# Patient Record
Sex: MALE | Race: WHITE | NOT HISPANIC OR LATINO
[De-identification: names, ages, dates, MRNs, and addresses within clinical notes are randomized per-mention and may not be internally consistent; named-entity substitution may affect disease eponyms.]

---

## 2017-11-15 PROBLEM — Z00.00 ENCOUNTER FOR PREVENTIVE HEALTH EXAMINATION: Status: ACTIVE | Noted: 2017-11-15

## 2019-04-09 ENCOUNTER — APPOINTMENT (OUTPATIENT)
Dept: NUCLEAR MEDICINE | Facility: CLINIC | Age: 69
End: 2019-04-09
Payer: COMMERCIAL

## 2019-04-09 PROCEDURE — A9552: CPT

## 2019-04-09 PROCEDURE — 78815 PET IMAGE W/CT SKULL-THIGH: CPT | Mod: PS

## 2019-08-07 ENCOUNTER — APPOINTMENT (OUTPATIENT)
Dept: CARDIOLOGY | Facility: CLINIC | Age: 69
End: 2019-08-07
Payer: COMMERCIAL

## 2019-08-07 VITALS
SYSTOLIC BLOOD PRESSURE: 120 MMHG | HEART RATE: 63 BPM | HEIGHT: 71 IN | WEIGHT: 217 LBS | DIASTOLIC BLOOD PRESSURE: 78 MMHG | BODY MASS INDEX: 30.38 KG/M2

## 2019-08-07 DIAGNOSIS — Z80.1 FAMILY HISTORY OF MALIGNANT NEOPLASM OF TRACHEA, BRONCHUS AND LUNG: ICD-10-CM

## 2019-08-07 DIAGNOSIS — Z77.098 CONTACT WITH AND (SUSPECTED) EXPOSURE TO OTHER HAZARDOUS, CHIEFLY NONMEDICINAL, CHEMICALS: ICD-10-CM

## 2019-08-07 DIAGNOSIS — Z85.47 PERSONAL HISTORY OF MALIGNANT NEOPLASM OF TESTIS: ICD-10-CM

## 2019-08-07 DIAGNOSIS — Z87.891 PERSONAL HISTORY OF NICOTINE DEPENDENCE: ICD-10-CM

## 2019-08-07 DIAGNOSIS — Z80.0 FAMILY HISTORY OF MALIGNANT NEOPLASM OF DIGESTIVE ORGANS: ICD-10-CM

## 2019-08-07 DIAGNOSIS — Z78.9 OTHER SPECIFIED HEALTH STATUS: ICD-10-CM

## 2019-08-07 PROCEDURE — 93000 ELECTROCARDIOGRAM COMPLETE: CPT

## 2019-08-07 PROCEDURE — 99214 OFFICE O/P EST MOD 30 MIN: CPT

## 2019-08-07 NOTE — DISCUSSION/SUMMARY
[FreeTextEntry1] : Patient with atypical chest pain, abnormal EKG with changes compared to previous one a few years ago, status post radiation treatment and chemotherapy for right-sided lung adenocarcinoma. Further evaluation with stress echocardiogram is needed. Baseline EKG is abnormal. Regular ETT would not be helpful. Avoid radiation exposure from nuclear stress testing due to history of radiation treatment. Stress echocardiogram is the best option\par \par We have requested previous fasting lipid profile from [facility]\par \par Patient with history of dilated ascending aorta and abdominal aorta: Follow up with ultrasound.\par \par Systolic ejection murmur secondary to aortic stenosis. Follow up on echocardiogram\par \par Continue current medications\par \par Followup after above test results.

## 2019-08-07 NOTE — HISTORY OF PRESENT ILLNESS
[FreeTextEntry1] : Nolan is a pleasant 68-year-old male with history of prostate cancer many years ago, hypertension, lung cancer for which he recently underwent chemotherapy and radiation treatment  ( stage IIIa, N2, M0 adenocarcinoma right lung).  Chemotherapy Therapy included carboplatin and Alimta.\par \par Patient has a dry cough. He denies hemoptysis or sputum\par \par My shortness of breath on exertion. Rare episodes of atypical chest pain participating in location and not exertional. It subsides quickly on its own.\par \par No symptoms of neuropathy. Denies any syncope, episodes of CHF, CVA or palpitations

## 2019-08-07 NOTE — PHYSICAL EXAM
[General Appearance - Well Developed] : well developed [Normal Appearance] : normal appearance [Well Groomed] : well groomed [No Deformities] : no deformities [General Appearance - Well Nourished] : well nourished [General Appearance - In No Acute Distress] : no acute distress [Normal Conjunctiva] : the conjunctiva exhibited no abnormalities [Eyelids - No Xanthelasma] : the eyelids demonstrated no xanthelasmas [Normal Oral Mucosa] : normal oral mucosa [No Oral Pallor] : no oral pallor [No Oral Cyanosis] : no oral cyanosis [Auscultation Breath Sounds / Voice Sounds] : lungs were clear to auscultation bilaterally [Respiration, Rhythm And Depth] : normal respiratory rhythm and effort [Exaggerated Use Of Accessory Muscles For Inspiration] : no accessory muscle use [Heart Sounds] : normal S1 and S2 [Heart Rate And Rhythm] : heart rate and rhythm were normal [Abdomen Soft] : soft [Abdomen Tenderness] : non-tender [Abnormal Walk] : normal gait [Gait - Sufficient For Exercise Testing] : the gait was sufficient for exercise testing [Nail Clubbing] : no clubbing of the fingernails [Cyanosis, Localized] : no localized cyanosis [Skin Color & Pigmentation] : normal skin color and pigmentation [] : no rash [No Venous Stasis] : no venous stasis [No Skin Ulcers] : no skin ulcer [Skin Lesions] : no skin lesions [Oriented To Time, Place, And Person] : oriented to person, place, and time [No Xanthoma] : no  xanthoma was observed [Mood] : the mood was normal [Affect] : the affect was normal [No Anxiety] : not feeling anxious [Memory Recent] : recent memory was not impaired [FreeTextEntry1] : 2/ 6 systolic ejection Murmur

## 2019-08-07 NOTE — ASSESSMENT
[FreeTextEntry1] : Review today:\par - EKG 08/07/19: Sinus rhythm. T. inversion in inferior leads and V6.\par - Labs May 2019: FBS 87,  abnormal creatinine. Normal LFT.\par - Echocardiogram September 2016: Normal LV function. Mild aortic stenosis. Aortic root 4.7 cm. Ascending aorta 4.4 cm she had\par - abdominal ultrasound September 2016: Proximal aorta 3.0 cm.

## 2019-08-15 ENCOUNTER — NON-APPOINTMENT (OUTPATIENT)
Age: 69
End: 2019-08-15

## 2019-08-20 ENCOUNTER — APPOINTMENT (OUTPATIENT)
Dept: CARDIOLOGY | Facility: CLINIC | Age: 69
End: 2019-08-20
Payer: COMMERCIAL

## 2019-08-20 PROCEDURE — 93306 TTE W/DOPPLER COMPLETE: CPT

## 2019-08-29 ENCOUNTER — APPOINTMENT (OUTPATIENT)
Dept: CARDIOLOGY | Facility: CLINIC | Age: 69
End: 2019-08-29
Payer: COMMERCIAL

## 2019-08-29 PROCEDURE — 93979 VASCULAR STUDY: CPT

## 2019-08-29 PROCEDURE — 93351 STRESS TTE COMPLETE: CPT

## 2019-08-30 ENCOUNTER — APPOINTMENT (OUTPATIENT)
Dept: CARDIOLOGY | Facility: CLINIC | Age: 69
End: 2019-08-30
Payer: COMMERCIAL

## 2019-08-30 VITALS
SYSTOLIC BLOOD PRESSURE: 106 MMHG | HEART RATE: 74 BPM | BODY MASS INDEX: 30.38 KG/M2 | HEIGHT: 71 IN | WEIGHT: 217 LBS | DIASTOLIC BLOOD PRESSURE: 70 MMHG | OXYGEN SATURATION: 96 %

## 2019-08-30 PROCEDURE — 99214 OFFICE O/P EST MOD 30 MIN: CPT

## 2019-09-02 NOTE — HISTORY OF PRESENT ILLNESS
[FreeTextEntry1] : Nolan is a pleasant 68-year-old male with history of prostate cancer many years ago, hypertension, lung cancer for which he recently underwent chemotherapy and radiation treatment  ( stage IIIa, N2, M0 adenocarcinoma right lung).  Chemotherapy Therapy included carboplatin and Alimta.\par \par Patient has a dry cough. He denies hemoptysis or sputum\par \par Mild  shortness of breath on exertion. Rare episodes of atypical chest pain participating in location and not exertional. It subsides quickly on its own.\par \par No symptoms of neuropathy. Denies any syncope, episodes of CHF, CVA or palpitations

## 2019-09-02 NOTE — PHYSICAL EXAM
[General Appearance - Well Developed] : well developed [Normal Appearance] : normal appearance [General Appearance - Well Nourished] : well nourished [Well Groomed] : well groomed [General Appearance - In No Acute Distress] : no acute distress [No Deformities] : no deformities [Normal Conjunctiva] : the conjunctiva exhibited no abnormalities [Eyelids - No Xanthelasma] : the eyelids demonstrated no xanthelasmas [Normal Oral Mucosa] : normal oral mucosa [No Oral Cyanosis] : no oral cyanosis [No Oral Pallor] : no oral pallor [Respiration, Rhythm And Depth] : normal respiratory rhythm and effort [Exaggerated Use Of Accessory Muscles For Inspiration] : no accessory muscle use [Auscultation Breath Sounds / Voice Sounds] : lungs were clear to auscultation bilaterally [Heart Rate And Rhythm] : heart rate and rhythm were normal [Abdomen Soft] : soft [Heart Sounds] : normal S1 and S2 [Abdomen Tenderness] : non-tender [Abnormal Walk] : normal gait [Nail Clubbing] : no clubbing of the fingernails [Gait - Sufficient For Exercise Testing] : the gait was sufficient for exercise testing [Cyanosis, Localized] : no localized cyanosis [Skin Color & Pigmentation] : normal skin color and pigmentation [No Venous Stasis] : no venous stasis [Skin Lesions] : no skin lesions [] : no rash [No Skin Ulcers] : no skin ulcer [No Xanthoma] : no  xanthoma was observed [Oriented To Time, Place, And Person] : oriented to person, place, and time [Affect] : the affect was normal [Mood] : the mood was normal [No Anxiety] : not feeling anxious [Memory Recent] : recent memory was not impaired [FreeTextEntry1] : 2/ 6 systolic ejection Murmur

## 2019-09-02 NOTE — DISCUSSION/SUMMARY
[FreeTextEntry1] : Patient with atypical chest pain - Unlikely to be angina. Stress echocardiogram did not show any ischemia.\par \par status post radiation treatment and chemotherapy for right-sided lung adenocarcinoma. \par \par I gave him a lab slip to recheck his Lipids and LFT\par \par Patient with history of dilated ascending aorta and abdominal aorta: Follow up with ultrasound Showed aortic root 4.5 cm. Prox aorta 3.4 cm ( was 3.0 cm in 2016)\par \par Systolic ejection murmur secondary to aortic stenosis. Follow up on echocardiogram showed mild-to-moderate aortic stenosis\par \par Continue current medications\par \par

## 2019-09-02 NOTE — ASSESSMENT
[FreeTextEntry1] : Review today:\par - EKG 08/07/19: Sinus rhythm. T. inversion in inferior leads and V6.\par - Labs May 2019: FBS 87,  abnormal creatinine. Normal LFT.\par - Echocardiogram September 2016: Normal LV function. Mild aortic stenosis. Aortic root 4.7 cm. Ascending aorta 4.4 cm she had\par - abdominal ultrasound September 2016: Proximal aorta 3.0 cm.\par - Stress echocardiogram August 2019: Exercised 6:30 minutes on Syed protocol. No angina. No ischemia by stress echocardiogram. Exercise PASP 36.\par -Echocardiogram August 2019: Ejection fraction 60%. Aortic root 4.5 cm. Mild-to-moderate aortic stenosis.\par - Abd Aortic US Aug 2019: prox aorta 3.4 cm

## 2020-02-05 ENCOUNTER — APPOINTMENT (OUTPATIENT)
Dept: CARDIOLOGY | Facility: CLINIC | Age: 70
End: 2020-02-05
Payer: COMMERCIAL

## 2020-02-05 PROCEDURE — 93979 VASCULAR STUDY: CPT

## 2020-02-05 PROCEDURE — 93306 TTE W/DOPPLER COMPLETE: CPT

## 2020-02-07 ENCOUNTER — APPOINTMENT (OUTPATIENT)
Dept: CARDIOLOGY | Facility: CLINIC | Age: 70
End: 2020-02-07
Payer: COMMERCIAL

## 2020-02-07 VITALS
BODY MASS INDEX: 30.1 KG/M2 | HEIGHT: 71 IN | HEART RATE: 75 BPM | DIASTOLIC BLOOD PRESSURE: 76 MMHG | OXYGEN SATURATION: 96 % | SYSTOLIC BLOOD PRESSURE: 114 MMHG | WEIGHT: 215 LBS

## 2020-02-07 PROCEDURE — 99214 OFFICE O/P EST MOD 30 MIN: CPT

## 2020-02-07 NOTE — HISTORY OF PRESENT ILLNESS
[FreeTextEntry1] : Nolan is a pleasant 69-year-old male with history of prostate cancer many years ago, hypertension, lung cancer for which he  underwent chemotherapy and radiation treatment  ( stage IIIa, N2, M0 adenocarcinoma right lung).  Chemotherapy Therapy included carboplatin and Alimta.\par \par Patient has a dry cough only in a.m. that has remained unchanged. He denies hemoptysis or sputum\par \par Mild  shortness of breath on exertion.  No atypical chest pain\par \par No symptoms of neuropathy. Denies any syncope, episodes of CHF, CVA or palpitations\par \par Most recent echocardiogram in February 20 showed normal EF and moderate aortic stenosis, dilated aortic root and ascending aorta to 4.5 cm, all PASP and moderate LVH\par \par Abdominal aortic ultrasound also showed mild dilation of proximal aorta to 3.2 cm.  This is unchanged from August 2019.  He is asymptomatic for dilated abdominal aorta

## 2020-02-07 NOTE — DISCUSSION/SUMMARY
[FreeTextEntry1] : No episodes of chest pain.  Mild shortness of breath on exertion has remained unchanged.\par \par status post radiation treatment and chemotherapy for right-sided lung adenocarcinoma. \par \par Patient with history of dilated ascending aorta and abdominal aorta: Follow up with ultrasound Showed aortic root 4.5 cm. Prox abdominal aorta 3.2 cm ( was 3.0 cm in 2016)\par \par Systolic ejection murmur secondary to aortic stenosis. Follow up on echocardiogram showed moderate aortic stenosis, asymptomatic.  Perhaps radiation treatment may have contributed to etiology\par \par Continue current medications\par \par Thank you for this referral and allowing me to participate in the care of this patient.  If can be of any further help or  if you have any questions, please do not hesitate to contact me\par \par \par Sincerely,\par \par Ja Myers MD, FACC, JOSE LUIS

## 2020-02-07 NOTE — ASSESSMENT
[FreeTextEntry1] : Tests:\par - EKG 08/07/19: Sinus rhythm. T. inversion in inferior leads and V6.\par - Labs May 2019: FBS 87,  abnormal creatinine. Normal LFT.\par \par Review today:\par - Echocardiogram September 2016: Normal LV function. Mild aortic stenosis. Aortic root 4.7 cm. Ascending aorta 4.4 cm she had\par - abdominal ultrasound September 2016: Proximal aorta 3.0 cm.\par - Stress echocardiogram August 2019: Exercised 6:30 minutes on Syed protocol. No angina. No ischemia by stress echocardiogram. Exercise PASP 36.\par -Echocardiogram August 2019: Ejection fraction 60%. Aortic root 4.5 cm. Mild-to-moderate aortic stenosis.\par - Abd Aortic US Aug 2019: prox aorta 3.4 cm\par -Abdominal aorta ultrasound February 2020: Proximal aorta 3.2 cm\par -Echocardiogram February 2020: Moderate AS, moderate LVH, normal EF, normal PASP.  Ascending aorta 4.5 cm

## 2020-02-07 NOTE — PHYSICAL EXAM
[General Appearance - Well Developed] : well developed [Normal Appearance] : normal appearance [Well Groomed] : well groomed [General Appearance - Well Nourished] : well nourished [No Deformities] : no deformities [Normal Conjunctiva] : the conjunctiva exhibited no abnormalities [General Appearance - In No Acute Distress] : no acute distress [Eyelids - No Xanthelasma] : the eyelids demonstrated no xanthelasmas [Normal Oral Mucosa] : normal oral mucosa [No Oral Pallor] : no oral pallor [No Oral Cyanosis] : no oral cyanosis [Respiration, Rhythm And Depth] : normal respiratory rhythm and effort [Exaggerated Use Of Accessory Muscles For Inspiration] : no accessory muscle use [Auscultation Breath Sounds / Voice Sounds] : lungs were clear to auscultation bilaterally [Heart Rate And Rhythm] : heart rate and rhythm were normal [Heart Sounds] : normal S1 and S2 [Abdomen Tenderness] : non-tender [Abdomen Soft] : soft [Abnormal Walk] : normal gait [Gait - Sufficient For Exercise Testing] : the gait was sufficient for exercise testing [Nail Clubbing] : no clubbing of the fingernails [Cyanosis, Localized] : no localized cyanosis [Skin Color & Pigmentation] : normal skin color and pigmentation [] : no rash [No Venous Stasis] : no venous stasis [Skin Lesions] : no skin lesions [No Skin Ulcers] : no skin ulcer [No Xanthoma] : no  xanthoma was observed [Oriented To Time, Place, And Person] : oriented to person, place, and time [Affect] : the affect was normal [Memory Recent] : recent memory was not impaired [Mood] : the mood was normal [No Anxiety] : not feeling anxious [FreeTextEntry1] : 2/ 6 systolic ejection Murmur is unchanged

## 2020-09-11 ENCOUNTER — APPOINTMENT (OUTPATIENT)
Dept: CARDIOLOGY | Facility: CLINIC | Age: 70
End: 2020-09-11
Payer: COMMERCIAL

## 2020-09-11 PROCEDURE — 93306 TTE W/DOPPLER COMPLETE: CPT

## 2020-10-03 ENCOUNTER — APPOINTMENT (OUTPATIENT)
Dept: DISASTER EMERGENCY | Facility: CLINIC | Age: 70
End: 2020-10-03

## 2020-10-04 LAB — SARS-COV-2 N GENE NPH QL NAA+PROBE: NOT DETECTED

## 2020-10-06 ENCOUNTER — OUTPATIENT (OUTPATIENT)
Dept: OUTPATIENT SERVICES | Facility: HOSPITAL | Age: 70
LOS: 1 days | End: 2020-10-06
Payer: COMMERCIAL

## 2020-10-06 PROCEDURE — 93320 DOPPLER ECHO COMPLETE: CPT | Mod: 26

## 2020-10-06 PROCEDURE — 93312 ECHO TRANSESOPHAGEAL: CPT | Mod: 26

## 2020-10-29 ENCOUNTER — APPOINTMENT (OUTPATIENT)
Dept: CARDIOLOGY | Facility: CLINIC | Age: 70
End: 2020-10-29
Payer: COMMERCIAL

## 2020-10-29 ENCOUNTER — NON-APPOINTMENT (OUTPATIENT)
Age: 70
End: 2020-10-29

## 2020-10-29 VITALS
HEIGHT: 71 IN | WEIGHT: 222 LBS | BODY MASS INDEX: 31.08 KG/M2 | OXYGEN SATURATION: 97 % | HEART RATE: 84 BPM | SYSTOLIC BLOOD PRESSURE: 120 MMHG | DIASTOLIC BLOOD PRESSURE: 80 MMHG | TEMPERATURE: 97.3 F

## 2020-10-29 DIAGNOSIS — Z92.89 PERSONAL HISTORY OF OTHER MEDICAL TREATMENT: ICD-10-CM

## 2020-10-29 PROCEDURE — 99215 OFFICE O/P EST HI 40 MIN: CPT

## 2020-10-29 PROCEDURE — 93000 ELECTROCARDIOGRAM COMPLETE: CPT

## 2020-10-29 PROCEDURE — 99072 ADDL SUPL MATRL&STAF TM PHE: CPT

## 2020-10-29 NOTE — ASSESSMENT
[FreeTextEntry1] : Tests:\par - EKG 08/07/19: Sinus rhythm. T. inversion in inferior leads and V6.\par - Labs May 2019: FBS 87,  abnormal creatinine. Normal LFT.\par \par Review today:\par - Echocardiogram September 2016: Normal LV function. Mild aortic stenosis. Aortic root 4.7 cm. Ascending aorta 4.4 cm she had\par - abdominal ultrasound September 2016: Proximal aorta 3.0 cm.\par - Stress echocardiogram August 2019: Exercised 6:30 minutes on Syed protocol. No angina. No ischemia by stress echocardiogram. Exercise PASP 36.\par -Echocardiogram August 2019: Ejection fraction 60%. Aortic root 4.5 cm. Mild-to-moderate aortic stenosis.\par - Abd Aortic US Aug 2019: prox aorta 3.4 cm\par -Abdominal aorta ultrasound February 2020: Proximal aorta 3.2 cm\par -Echocardiogram February 2020: Moderate AS, moderate LVH, normal EF, normal PASP.  Ascending aorta 4.5 cm\par -SHANDA October 2020: Aortic valve area 1.1 cm² by planimetry.  Peak aortic valve gradient 36 mmHg.

## 2020-10-29 NOTE — DISCUSSION/SUMMARY
[FreeTextEntry1] : No episodes of chest pain.  Very slight shortness of breath on exertion has remained unchanged probably noncardiac in etiology.\par \par status post radiation treatment and chemotherapy for right-sided lung adenocarcinoma. \par \par Patient with history of dilated ascending aorta and abdominal aorta: Follow up with ultrasound Showed aortic root 4.5 cm. Prox abdominal aorta 3.2 cm ( was 3.0 cm in 2016)\par \par Systolic ejection murmur secondary to aortic stenosis. Follow up on echocardiogram showed moderate aortic stenosis, asymptomatic.  Perhaps radiation treatment may have contributed to etiology.  Moderate to severe aortic stenosis by planimetry on SHANDA.  \par \par Continue current medications\par \par Follow-up in the office after TTE and abdominal ultrasound in 6 months\par \par Thank you for this referral and allowing me to participate in the care of this patient.  If can be of any further help or  if you have any questions, please do not hesitate to contact me\par \par \par Sincerely,\par \par Ja Myers MD, FACC, JOSE LUIS

## 2020-10-29 NOTE — HISTORY OF PRESENT ILLNESS
[FreeTextEntry1] : Nolan is a pleasant 70-year-old male with history of prostate cancer many years ago, hypertension, lung cancer for which he  underwent chemotherapy and radiation treatment  ( stage IIIa, N2, M0 adenocarcinoma right lung).  Chemotherapy Therapy included carboplatin and Alimta.\par \par Patient has a dry cough only in a.m. that has remained unchanged. He denies hemoptysis or sputum\par \par Very slight  shortness of breath on exertion -no change over the past year.  No atypical chest pain.  Denies PND, orthopnea, pedal edema.\par \par No symptoms of neuropathy. Denies any syncope, episodes of CHF, CVA or palpitations\par \par Most recent echocardiogram in February 20 showed normal EF and moderate aortic stenosis, dilated aortic root and ascending aorta to 4.5 cm, all PASP and moderate LVH\par \par Abdominal aortic ultrasound also showed mild dilation of proximal aorta to 3.2 cm.  This is unchanged from August 2019.  He is asymptomatic for dilated abdominal aorta

## 2020-10-29 NOTE — PHYSICAL EXAM
[General Appearance - Well Developed] : well developed [Normal Appearance] : normal appearance [Well Groomed] : well groomed [General Appearance - Well Nourished] : well nourished [No Deformities] : no deformities [General Appearance - In No Acute Distress] : no acute distress [Normal Conjunctiva] : the conjunctiva exhibited no abnormalities [Eyelids - No Xanthelasma] : the eyelids demonstrated no xanthelasmas [Normal Oral Mucosa] : normal oral mucosa [No Oral Pallor] : no oral pallor [No Oral Cyanosis] : no oral cyanosis [Respiration, Rhythm And Depth] : normal respiratory rhythm and effort [Exaggerated Use Of Accessory Muscles For Inspiration] : no accessory muscle use [Auscultation Breath Sounds / Voice Sounds] : lungs were clear to auscultation bilaterally [Heart Rate And Rhythm] : heart rate and rhythm were normal [Heart Sounds] : normal S1 and S2 [FreeTextEntry1] : 2-3/ 6 systolic ejection Murmur is unchanged.  Diminished A2.  High-frequency murmur. [Abdomen Soft] : soft [Abdomen Tenderness] : non-tender [Abnormal Walk] : normal gait [Gait - Sufficient For Exercise Testing] : the gait was sufficient for exercise testing [Nail Clubbing] : no clubbing of the fingernails [Cyanosis, Localized] : no localized cyanosis [Skin Color & Pigmentation] : normal skin color and pigmentation [] : no rash [No Venous Stasis] : no venous stasis [Skin Lesions] : no skin lesions [No Skin Ulcers] : no skin ulcer [No Xanthoma] : no  xanthoma was observed [Oriented To Time, Place, And Person] : oriented to person, place, and time [Affect] : the affect was normal [Mood] : the mood was normal [Memory Recent] : recent memory was not impaired [No Anxiety] : not feeling anxious

## 2021-04-12 ENCOUNTER — APPOINTMENT (OUTPATIENT)
Dept: CARDIOLOGY | Facility: CLINIC | Age: 71
End: 2021-04-12

## 2021-04-13 ENCOUNTER — NON-APPOINTMENT (OUTPATIENT)
Age: 71
End: 2021-04-13

## 2021-04-15 ENCOUNTER — APPOINTMENT (OUTPATIENT)
Dept: CARDIOLOGY | Facility: CLINIC | Age: 71
End: 2021-04-15
Payer: COMMERCIAL

## 2021-04-15 PROCEDURE — 93306 TTE W/DOPPLER COMPLETE: CPT

## 2021-04-15 PROCEDURE — 93979 VASCULAR STUDY: CPT

## 2021-04-15 PROCEDURE — 99072 ADDL SUPL MATRL&STAF TM PHE: CPT

## 2021-04-15 PROCEDURE — 93880 EXTRACRANIAL BILAT STUDY: CPT

## 2021-04-22 ENCOUNTER — NON-APPOINTMENT (OUTPATIENT)
Age: 71
End: 2021-04-22

## 2021-05-07 ENCOUNTER — APPOINTMENT (OUTPATIENT)
Dept: CARDIOLOGY | Facility: CLINIC | Age: 71
End: 2021-05-07
Payer: COMMERCIAL

## 2021-05-07 ENCOUNTER — NON-APPOINTMENT (OUTPATIENT)
Age: 71
End: 2021-05-07

## 2021-05-07 VITALS — SYSTOLIC BLOOD PRESSURE: 136 MMHG | DIASTOLIC BLOOD PRESSURE: 70 MMHG | HEIGHT: 71 IN

## 2021-05-07 VITALS — OXYGEN SATURATION: 98 % | HEART RATE: 80 BPM

## 2021-05-07 PROCEDURE — 99215 OFFICE O/P EST HI 40 MIN: CPT

## 2021-05-07 PROCEDURE — 99072 ADDL SUPL MATRL&STAF TM PHE: CPT

## 2021-05-07 NOTE — HISTORY OF PRESENT ILLNESS
[FreeTextEntry1] : Nolan is a pleasant 70-year-old male with history of prostate cancer many years ago, hypertension, lung cancer for which he  underwent chemotherapy and radiation treatment  ( stage IIIa, N2, M0 adenocarcinoma right lung).  Chemotherapy Therapy included carboplatin and Alimta. \par \par Patient has a dry cough only in a.m. that has remained unchanged. He denies hemoptysis or sputum.  He has a spot in the right upper lung for which he undergoes 6 monthly PET scan with Dr. Potts\par \par Very slight  shortness of breath on exertion -no change over the past year.  No atypical chest pain.  Denies PND, orthopnea, pedal edema.  No chest discomfort on exertion.  No lightheadedness or syncope.\par \par No symptoms of neuropathy. Denies any syncope, episodes of CHF, CVA or palpitations.  Rare episodes of postural lightheadedness\par \par Echocardiogram in April 2021 showed significant progression of aortic stenosis to severe.  DVI 0.2. Aortic valve area 0.6 cm².  CT arctic valve calcium score more than 2500. \par \par Abdominal aortic ultrasound also showed mild dilation of proximal aorta to 3.2 cm.  This is unchanged from August 2019.  He is asymptomatic for dilated abdominal aorta\par \par Most recent carotid ultrasound did not have significant stenosis

## 2021-05-07 NOTE — REVIEW OF SYSTEMS
[Dyspnea on exertion] : dyspnea during exertion [Negative] : Heme/Lymph [FreeTextEntry5] : Slight postural lightheadedness on rare occasions

## 2021-05-07 NOTE — DISCUSSION/SUMMARY
[FreeTextEntry1] : No episodes of chest pain.  Very slight shortness of breath on exertion has remained unchanged.  Slight postural lightheadedness on rare occasions.  Progression of aortic valve to severe stenosis.\par \par status post radiation treatment and chemotherapy for right-sided lung adenocarcinoma. \par \par Patient with history of dilated ascending aorta and abdominal aorta: Follow up with ultrasound Showed aortic root 4.5 cm. Prox abdominal aorta 3.2 cm ( was 3.0 cm in 2016)\par \par Continue current medications.  If he has exertional angina, presyncope or syncope, worsening shortness of breath with minimal activity -call us stat or go to the ER stat.\par \par Cardiothoracic consultation for aortic valve replacement.  He will need cardiac cath -Dr. Peck  will be coordinating this.\par \par Start low-dose aspirin and statin therapy.  LDL was 101 in September 2019\par \par \par \par Thank you for this referral and allowing me to participate in the care of this patient.  If can be of any further help or  if you have any questions, please do not hesitate to contact me\par \par \par Sincerely,\par \par Ja Myers MD, FACC, JOSE LUIS

## 2021-05-07 NOTE — ASSESSMENT
[FreeTextEntry1] : Tests:\par - EKG 08/07/19: Sinus rhythm. T. inversion in inferior leads and V6.\par - Labs May 2019: FBS 87,  abnormal creatinine. Normal LFT.\par - Echocardiogram September 2016: Normal LV function. Mild aortic stenosis. Aortic root 4.7 cm. Ascending aorta 4.4 cm she had\par - abdominal ultrasound September 2016: Proximal aorta 3.0 cm.\par - Stress echocardiogram August 2019: Exercised 6:30 minutes on Syed protocol. No angina. No ischemia by stress echocardiogram. Exercise PASP 36.\par \par Review today:\par \par -Echocardiogram August 2019: Ejection fraction 60%. Aortic root 4.5 cm. Mild-to-moderate aortic stenosis.\par - Abd Aortic US Aug 2019: prox aorta 3.4 cm\par -Abdominal aorta ultrasound February 2020: Proximal aorta 3.2 cm\par -Echocardiogram February 2020: Moderate AS, moderate LVH, normal EF, normal PASP.  Ascending aorta 4.5 cm\par -SHANDA October 2020: Aortic valve area 1.1 cm² by planimetry.  Peak aortic valve gradient 36 mmHg.\par - Echocardiogram in April 2021 showed significant progression of aortic stenosis to severe.  DVI 0.2. Aortic valve area 0.6 cm².  \par - CT arctic valve calcium score more than 2500.  Also CAD\par - Abdominal aortic ultrasound also showed mild dilation of proximal aorta to 3.2 cm.  This is unchanged from August 2019.  He is asymptomatic for dilated abdominal aorta\par -April 2021 carotid ultrasound did not have significant stenosis

## 2021-05-07 NOTE — PHYSICAL EXAM
[General Appearance - Well Developed] : well developed [Normal Appearance] : normal appearance [Well Groomed] : well groomed [General Appearance - Well Nourished] : well nourished [General Appearance - In No Acute Distress] : no acute distress [No Deformities] : no deformities [Normal Conjunctiva] : the conjunctiva exhibited no abnormalities [Eyelids - No Xanthelasma] : the eyelids demonstrated no xanthelasmas [Normal Oral Mucosa] : normal oral mucosa [No Oral Pallor] : no oral pallor [No Oral Cyanosis] : no oral cyanosis [Respiration, Rhythm And Depth] : normal respiratory rhythm and effort [Exaggerated Use Of Accessory Muscles For Inspiration] : no accessory muscle use [Auscultation Breath Sounds / Voice Sounds] : lungs were clear to auscultation bilaterally [Heart Rate And Rhythm] : heart rate and rhythm were normal [Heart Sounds] : normal S1 and S2 [FreeTextEntry1] : 2-3/ 6 systolic ejection Murmur is unchanged.  Diminished A2.  High-frequency murmur. [Abdomen Soft] : soft [Abdomen Tenderness] : non-tender [Abnormal Walk] : normal gait [Gait - Sufficient For Exercise Testing] : the gait was sufficient for exercise testing [Nail Clubbing] : no clubbing of the fingernails [Cyanosis, Localized] : no localized cyanosis [Skin Color & Pigmentation] : normal skin color and pigmentation [] : no rash [No Venous Stasis] : no venous stasis [Skin Lesions] : no skin lesions [No Skin Ulcers] : no skin ulcer [No Xanthoma] : no  xanthoma was observed [Oriented To Time, Place, And Person] : oriented to person, place, and time [Affect] : the affect was normal [Mood] : the mood was normal [Memory Recent] : recent memory was not impaired [No Anxiety] : not feeling anxious

## 2021-05-24 ENCOUNTER — APPOINTMENT (OUTPATIENT)
Dept: CARDIOTHORACIC SURGERY | Facility: CLINIC | Age: 71
End: 2021-05-24
Payer: COMMERCIAL

## 2021-05-24 VITALS
OXYGEN SATURATION: 98 % | BODY MASS INDEX: 29.57 KG/M2 | HEART RATE: 78 BPM | WEIGHT: 212 LBS | SYSTOLIC BLOOD PRESSURE: 122 MMHG | DIASTOLIC BLOOD PRESSURE: 82 MMHG

## 2021-05-24 PROCEDURE — 99205 OFFICE O/P NEW HI 60 MIN: CPT

## 2021-05-24 PROCEDURE — 99072 ADDL SUPL MATRL&STAF TM PHE: CPT

## 2021-05-24 NOTE — DATA REVIEWED
[FreeTextEntry1] : Transthoracic Echocardiogram from 04/15/21 at [facility] \par - LVEF 60%\par - Heavily calcified trileaflet aortic valve with decreased opening. PAVG 29 mmHg, MAVG 16 mmHg, GEORGETTE 0.6  cm2, moderate aortic regurgitation with severe aortic stenosis\par

## 2021-05-24 NOTE — HISTORY OF PRESENT ILLNESS
[FreeTextEntry1] : Mr. DRAPER is a 70 year old male referred by Dr. Myers who presents for consultation. His past medical history includes HTN, prostate cancer (neoadjuvant therapy including radition), right lung adenocarcinoma, AAA, testicular cancer, ascending aortic dilation, and PFO. \par \par He complains of light handedness and with exertion and was found to have severe aortic stenosis on echocardiogram with an GEORGETTE of 0.6 cm sq.\par \par \par \par

## 2021-05-24 NOTE — PHYSICAL EXAM
[General Appearance - Alert] : alert [General Appearance - In No Acute Distress] : in no acute distress [Sclera] : the sclera and conjunctiva were normal [Outer Ear] : the ears and nose were normal in appearance [Neck Appearance] : the appearance of the neck was normal [Exaggerated Use Of Accessory Muscles For Inspiration] : no accessory muscle use [Apical Impulse] : the apical impulse was normal [Heart Sounds] : normal S1 and S2 [FreeTextEntry1] : IV/VI systolic murmur at the right second intercostal space [Examination Of The Chest] : the chest was normal in appearance [Arterial Pulses Carotid] : carotid pulses were normal with no bruits [Arterial Pulses Femoral] : femoral pulses were normal without bruits [Bowel Sounds] : normal bowel sounds [Abdomen Soft] : soft [No CVA Tenderness] : no ~M costovertebral angle tenderness [Abnormal Walk] : normal gait [Nail Clubbing] : no clubbing  or cyanosis of the fingernails [Skin Color & Pigmentation] : normal skin color and pigmentation [Skin Turgor] : normal skin turgor [] : no rash [Cranial Nerves] : cranial nerves 2-12 were intact [Sensation] : the sensory exam was normal to light touch and pinprick [Oriented To Time, Place, And Person] : oriented to person, place, and time [Affect] : the affect was normal

## 2021-05-24 NOTE — ASSESSMENT
[FreeTextEntry1] : Mr. Elise is a 70 year old male with severe symptomatic aortic stenosis with three cancers and previous radiation to the chest. He is a candidate for TAVR procedure. he will need a catheterization to assess the coronary anatomy and will be scheduled for TAVR if coronaries are non obstructive. \par \par  I explained the risks, benefits, and alternatives of surgery to the patient and family. They understand and agree to proceed. I thank you for the opportunity to participate in the care of your patients. Please do not hesitate to contact me should you have any questions.\par \par \par

## 2021-05-24 NOTE — CONSULT LETTER
[Dear  ___] : Dear  [unfilled], [Consult Letter:] : I had the pleasure of evaluating your patient, [unfilled]. [Please see my note below.] : Please see my note below. [Consult Closing:] : Thank you very much for allowing me to participate in the care of this patient.  If you have any questions, please do not hesitate to contact me. [Sincerely,] : Sincerely, [FreeTextEntry3] : Omid Peck MD

## 2021-06-25 ENCOUNTER — APPOINTMENT (OUTPATIENT)
Dept: DISASTER EMERGENCY | Facility: CLINIC | Age: 71
End: 2021-06-25

## 2021-06-26 LAB — SARS-COV-2 N GENE NPH QL NAA+PROBE: NOT DETECTED

## 2021-06-28 ENCOUNTER — OUTPATIENT (OUTPATIENT)
Dept: INPATIENT UNIT | Facility: HOSPITAL | Age: 71
LOS: 1 days | End: 2021-06-28
Payer: COMMERCIAL

## 2021-06-28 PROCEDURE — 93454 CORONARY ARTERY ANGIO S&I: CPT | Mod: 26

## 2021-06-28 PROCEDURE — 93010 ELECTROCARDIOGRAM REPORT: CPT

## 2021-07-02 ENCOUNTER — APPOINTMENT (OUTPATIENT)
Dept: CARDIOLOGY | Facility: CLINIC | Age: 71
End: 2021-07-02
Payer: COMMERCIAL

## 2021-07-02 VITALS
DIASTOLIC BLOOD PRESSURE: 80 MMHG | WEIGHT: 210 LBS | SYSTOLIC BLOOD PRESSURE: 132 MMHG | TEMPERATURE: 98.4 F | BODY MASS INDEX: 29.4 KG/M2 | HEART RATE: 67 BPM | OXYGEN SATURATION: 97 % | HEIGHT: 71 IN

## 2021-07-02 PROCEDURE — 99213 OFFICE O/P EST LOW 20 MIN: CPT

## 2021-07-02 RX ORDER — CYANOCOBALAMIN, ISOPROPYL ALCOHOL 1000MCG/ML
KIT INJECTION
Refills: 0 | Status: DISCONTINUED | COMMUNITY
End: 2021-07-02

## 2021-07-02 NOTE — REVIEW OF SYSTEMS
[Negative] : Heme/Lymph [Dyspnea on exertion] : not dyspnea during exertion [FreeTextEntry5] : Slight postural lightheadedness on rare occasions

## 2021-07-02 NOTE — REASON FOR VISIT
[Follow-Up - Clinic] : a clinic follow-up of [Structural Heart and Valve Disease] : structural heart and valve disease [FreeTextEntry2] : for issues outlined below

## 2021-07-02 NOTE — PHYSICAL EXAM
[General Appearance - Well Developed] : well developed [Normal Appearance] : normal appearance [Well Groomed] : well groomed [General Appearance - Well Nourished] : well nourished [No Deformities] : no deformities [General Appearance - In No Acute Distress] : no acute distress [Normal Conjunctiva] : the conjunctiva exhibited no abnormalities [Eyelids - No Xanthelasma] : the eyelids demonstrated no xanthelasmas [Normal Oral Mucosa] : normal oral mucosa [No Oral Pallor] : no oral pallor [No Oral Cyanosis] : no oral cyanosis [Respiration, Rhythm And Depth] : normal respiratory rhythm and effort [Exaggerated Use Of Accessory Muscles For Inspiration] : no accessory muscle use [Auscultation Breath Sounds / Voice Sounds] : lungs were clear to auscultation bilaterally [Heart Sounds] : normal S1 and S2 [Heart Rate And Rhythm] : heart rate and rhythm were normal [Abdomen Soft] : soft [Abdomen Tenderness] : non-tender [Gait - Sufficient For Exercise Testing] : the gait was sufficient for exercise testing [Abnormal Walk] : normal gait [Nail Clubbing] : no clubbing of the fingernails [Cyanosis, Localized] : no localized cyanosis [Skin Color & Pigmentation] : normal skin color and pigmentation [No Venous Stasis] : no venous stasis [] : no rash [Skin Lesions] : no skin lesions [No Skin Ulcers] : no skin ulcer [No Xanthoma] : no  xanthoma was observed [Oriented To Time, Place, And Person] : oriented to person, place, and time [Affect] : the affect was normal [Mood] : the mood was normal [Memory Recent] : recent memory was not impaired [No Anxiety] : not feeling anxious [FreeTextEntry1] : 3/ 6 systolic Murmur is unchanged.  Diminished A2.  High-frequency murmur.

## 2021-07-02 NOTE — HISTORY OF PRESENT ILLNESS
[FreeTextEntry1] : Nolan is a pleasant 70-year-old male with history of aortic stenosis with exertional lightheadedness, AAA, hypertension, hyperlipidemia, prostate cancer many years ago, lung cancer for which he  underwent chemotherapy and radiation treatment  ( stage IIIa, N2, M0 adenocarcinoma right lung).  Chemotherapy Therapy included carboplatin and Alimta here for post diagnostic cardiac catheterization follow-up.  Right radial artery access site clean dry intact.\par \par No significant coronary disease.  This was performed as preoperative evaluation for severe aortic stenosis per Dr. Peck. No chest pain.\par \par \par Echocardiogram in April 2021 showed significant progression of aortic stenosis to severe.  DVI 0.2. Aortic valve area 0.6 cm².  CT arctic valve calcium score more than 2500. \par \par Abdominal aortic ultrasound also showed mild dilation of proximal aorta to 3.2 cm.  This is unchanged from August 2019.  He is asymptomatic for dilated abdominal aorta\par \par Most recent carotid ultrasound did not have significant stenosis

## 2021-07-02 NOTE — ASSESSMENT
[FreeTextEntry1] : Tests:\par - EKG 08/07/19: Sinus rhythm. T. inversion in inferior leads and V6.\par - Labs May 2019: FBS 87,  abnormal creatinine. Normal LFT.\par - Echocardiogram September 2016: Normal LV function. Mild aortic stenosis. Aortic root 4.7 cm. Ascending aorta 4.4 cm she had\par - abdominal ultrasound September 2016: Proximal aorta 3.0 cm.\par - Stress echocardiogram August 2019: Exercised 6:30 minutes on Syed protocol. No angina. No ischemia by stress echocardiogram. Exercise PASP 36.\par \par Review today:\par \par -Echocardiogram August 2019: Ejection fraction 60%. Aortic root 4.5 cm. Mild-to-moderate aortic stenosis.\par - Abd Aortic US Aug 2019: prox aorta 3.4 cm\par -Abdominal aorta ultrasound February 2020: Proximal aorta 3.2 cm\par -Echocardiogram February 2020: Moderate AS, moderate LVH, normal EF, normal PASP.  Ascending aorta 4.5 cm\par -SHANDA October 2020: Aortic valve area 1.1 cm² by planimetry.  Peak aortic valve gradient 36 mmHg.\par - Echocardiogram in April 2021 showed significant progression of aortic stenosis to severe.  DVI 0.2. Aortic valve area 0.6 cm².  \par - CT arctic valve calcium score more than 2500.  Also CAD\par - Abdominal aortic ultrasound also showed mild dilation of proximal aorta to 3.2 cm.  This is unchanged from August 2019.  He is asymptomatic for dilated abdominal aorta\par -April 2021 carotid ultrasound did not have significant stenosis\par \par 7/2021 cath

## 2021-07-02 NOTE — DISCUSSION/SUMMARY
[FreeTextEntry1] : \par 70-year-old male with history of aortic stenosis with exertional lightheadedness, AAA, hypertension, hyperlipidemia, prostate cancer many years ago, lung cancer for which he  underwent chemotherapy and radiation treatment  ( stage IIIa, N2, M0 adenocarcinoma right lung).  Chemotherapy Therapy included carboplatin and Alimta here for post diagnostic cardiac catheterization follow-up.  Right radial artery access site clean dry intact. Aortic root dilation, sever aortic stenosis. Very slight shortness of breath on exertion has remained unchanged.  Slight postural lightheadedness on rare occasions. Continue current medications. Continue evaluation with Dr. Peck for valve replacement (I contacted his office with results update and to coordinate next steps, will have his office contact patient). Return in 3 months with Dr. Myers. Dr. Peck evaluation prior to this.

## 2021-07-20 ENCOUNTER — OUTPATIENT (OUTPATIENT)
Dept: OUTPATIENT SERVICES | Facility: HOSPITAL | Age: 71
LOS: 1 days | End: 2021-07-20
Payer: MEDICARE

## 2021-07-20 ENCOUNTER — RESULT REVIEW (OUTPATIENT)
Age: 71
End: 2021-07-20

## 2021-07-20 DIAGNOSIS — I35.0 NONRHEUMATIC AORTIC (VALVE) STENOSIS: ICD-10-CM

## 2021-07-20 PROCEDURE — 75574 CT ANGIO HRT W/3D IMAGE: CPT | Mod: 26,MH

## 2021-07-20 PROCEDURE — 93306 TTE W/DOPPLER COMPLETE: CPT

## 2021-07-20 PROCEDURE — 74174 CTA ABD&PLVS W/CONTRAST: CPT | Mod: 26,MH

## 2021-07-20 PROCEDURE — 74174 CTA ABD&PLVS W/CONTRAST: CPT

## 2021-07-20 PROCEDURE — 75574 CT ANGIO HRT W/3D IMAGE: CPT

## 2021-07-20 PROCEDURE — 93306 TTE W/DOPPLER COMPLETE: CPT | Mod: 26

## 2021-08-03 ENCOUNTER — APPOINTMENT (OUTPATIENT)
Dept: CARDIOTHORACIC SURGERY | Facility: CLINIC | Age: 71
End: 2021-08-03
Payer: COMMERCIAL

## 2021-08-03 VITALS
DIASTOLIC BLOOD PRESSURE: 78 MMHG | HEIGHT: 71 IN | RESPIRATION RATE: 16 BRPM | HEART RATE: 68 BPM | SYSTOLIC BLOOD PRESSURE: 137 MMHG | OXYGEN SATURATION: 97 % | WEIGHT: 210 LBS | BODY MASS INDEX: 29.4 KG/M2

## 2021-08-03 PROCEDURE — 99214 OFFICE O/P EST MOD 30 MIN: CPT

## 2021-08-06 NOTE — REVIEW OF SYSTEMS
[Feeling Tired] : feeling tired [Negative] : Heme/Lymph [Feeling Poorly] : not feeling poorly [Chest Pain] : no chest pain [Palpitations] : no palpitations

## 2021-08-06 NOTE — DATA REVIEWED
[FreeTextEntry1] : CTA of the chest abdomen and pelvis from 7/20/21 at [facility] \par - Aortic measurements as described\par - There is a mural thrombus/soft plaque in the infrarenal abdominal aorta\par \par \par Cardiac Catheterization from 6/28/21 at [facility]\par - Normal Coronary Arteries\par \par \par Transthoracic Echocardiogram from 7/20/21 at [facility] \par - LVEF 45%\par - There is moderate LVH\par - Moderately enlarged left atrium\par - Dilation of the ascending aorta and root. \par - Mild thickening and calcification of the anterior and posterior mitral valve leaflets, no evidence of mitral valve regurgitation.\par - Peak transaortic gradient equals 28.2 mmHg, MAVG 14.4 mmHg, the calculated aortic valve area equals 0.95 cm2, by the continuity equation consistent with severe aortic stenosis. The dimensionless Index value is 0.24

## 2021-08-06 NOTE — ASSESSMENT
[FreeTextEntry1] : Mr Elise reports to the office today for TAVR candidacy evaluation. The imaging and reports were personally reviewed by me and the Structural Heart Team. After review it appears that the aortic valve annulus is too large for TAVR intervention. This would put him at a very high risk for significant paravalvular leak. However, should the valve annulus have been smaller he would have been a TAVR candidate. \par \par Mr Elise is an otherwise healthy, very active man who enjoys recreational boating and is active with walking and home chores. I am recommending surgical aortic valve replacement to address his severe aortic stenosis. \par \par Risks benefits and alternatives to aortic valve placement were discussed with the patient in detail. Risks discussed included, but not limited to infection, bleeding, myocardial infarction, cerebrovascular accident, renal failure, vascular injury requiring intervention, cardiac rupture, and death. The procedure, hospital stay and recovery was discussed in detail. All questions addressed. Patient would like to proceed with surgical intervention as discussed.\par \par \par PLAN:\par - Carotid Artery Duplex\par - Pulmonary Function Testing\par - Surgical Aortic Valve Replacement \par \par \par \par \par \par Hussein LEE NP am scribing for and in the presence of Dr. Peck the following sections HISTORY OF PRESENT ILLNESS, PAST MEDICAL/FAMILY/SOCIAL HISTORY; REVIEW OF SYSTEMS; VITAL SIGNS; PHYSICAL EXAM; DISPOSITION.\par \par "I personally performed the services described in the documentation, reviewed the documentation recorded by the scribe in my presence and accurately and completely records my words and actions."\par

## 2021-08-06 NOTE — HISTORY OF PRESENT ILLNESS
[FreeTextEntry1] : Mr. ELISE is a 70 year old male referred by Dr. Myers who presents for consultation. His past medical history includes HTN, prostate cancer (neoadjuvant therapy in 1997), right lung adenocarcinoma, AAA, ascending aortic dilation, and PFO. He has not received the COVID -19 vaccination and had COVID in December 2020.\par \par Mr Elise had originally reported to the office for consultation in [place] on 5/24/21. At that time it was requested that he obtain additional imaging to determine TAVR candidacy. \par \par \par

## 2021-08-06 NOTE — PHYSICAL EXAM
[Sclera] : the sclera and conjunctiva were normal [Neck Appearance] : the appearance of the neck was normal [Respiration, Rhythm And Depth] : normal respiratory rhythm and effort [Auscultation Breath Sounds / Voice Sounds] : lungs were clear to auscultation bilaterally [Heart Rate And Rhythm] : heart rate was normal and rhythm regular [Examination Of The Chest] : the chest was normal in appearance [2+] : left 2+ [Motor Tone] : muscle strength and tone were normal [Skin Color & Pigmentation] : normal skin color and pigmentation [Sensation] : the sensory exam was normal to light touch and pinprick [Motor Exam] : the motor exam was normal [Oriented To Time, Place, And Person] : oriented to person, place, and time [Impaired Insight] : insight and judgment were intact [FreeTextEntry1] : NYHAC II   Grade 3/6 systolic murmur

## 2021-08-06 NOTE — CONSULT LETTER
[Dear  ___] : Dear  [unfilled], [Consult Letter:] : I had the pleasure of evaluating your patient, [unfilled]. [Please see my note below.] : Please see my note below. [Consult Closing:] : Thank you very much for allowing me to participate in the care of this patient.  If you have any questions, please do not hesitate to contact me. [Sincerely,] : Sincerely, [DrJennifer  ___] : Dr. SUAREZ [FreeTextEntry3] : Omid Peck MD\par  of Cardiothoracic Surgery\par [facility]\par [address] \[place] [address]\par [phone]\par  [FreeTextEntry2] : Ja Myers MD

## 2021-09-24 ENCOUNTER — APPOINTMENT (OUTPATIENT)
Dept: DISASTER EMERGENCY | Facility: CLINIC | Age: 71
End: 2021-09-24

## 2021-09-24 DIAGNOSIS — Z01.818 ENCOUNTER FOR OTHER PREPROCEDURAL EXAMINATION: ICD-10-CM

## 2021-09-25 LAB — SARS-COV-2 N GENE NPH QL NAA+PROBE: NOT DETECTED

## 2021-09-27 ENCOUNTER — RESULT REVIEW (OUTPATIENT)
Age: 71
End: 2021-09-27

## 2021-09-27 ENCOUNTER — OUTPATIENT (OUTPATIENT)
Dept: OUTPATIENT SERVICES | Facility: HOSPITAL | Age: 71
LOS: 1 days | End: 2021-09-27
Payer: MEDICARE

## 2021-09-27 ENCOUNTER — APPOINTMENT (OUTPATIENT)
Dept: PULMONOLOGY | Facility: CLINIC | Age: 71
End: 2021-09-27
Payer: COMMERCIAL

## 2021-09-27 VITALS
SYSTOLIC BLOOD PRESSURE: 142 MMHG | WEIGHT: 214.07 LBS | RESPIRATION RATE: 20 BRPM | TEMPERATURE: 98 F | OXYGEN SATURATION: 97 % | HEIGHT: 70 IN | HEART RATE: 70 BPM | DIASTOLIC BLOOD PRESSURE: 82 MMHG

## 2021-09-27 VITALS — WEIGHT: 212 LBS | HEIGHT: 69 IN | BODY MASS INDEX: 31.4 KG/M2

## 2021-09-27 DIAGNOSIS — Z98.890 OTHER SPECIFIED POSTPROCEDURAL STATES: Chronic | ICD-10-CM

## 2021-09-27 DIAGNOSIS — Z01.818 ENCOUNTER FOR OTHER PREPROCEDURAL EXAMINATION: ICD-10-CM

## 2021-09-27 DIAGNOSIS — D49.59 NEOPLASM OF UNSPECIFIED BEHAVIOR OF OTHER GENITOURINARY ORGAN: Chronic | ICD-10-CM

## 2021-09-27 DIAGNOSIS — Z90.79 ACQUIRED ABSENCE OF OTHER GENITAL ORGAN(S): Chronic | ICD-10-CM

## 2021-09-27 DIAGNOSIS — I10 ESSENTIAL (PRIMARY) HYPERTENSION: ICD-10-CM

## 2021-09-27 DIAGNOSIS — Z92.21 PERSONAL HISTORY OF ANTINEOPLASTIC CHEMOTHERAPY: Chronic | ICD-10-CM

## 2021-09-27 DIAGNOSIS — Z29.9 ENCOUNTER FOR PROPHYLACTIC MEASURES, UNSPECIFIED: ICD-10-CM

## 2021-09-27 DIAGNOSIS — I35.0 NONRHEUMATIC AORTIC (VALVE) STENOSIS: ICD-10-CM

## 2021-09-27 DIAGNOSIS — Z46.89 ENCOUNTER FOR FITTING AND ADJUSTMENT OF OTHER SPECIFIED DEVICES: Chronic | ICD-10-CM

## 2021-09-27 LAB
A1C WITH ESTIMATED AVERAGE GLUCOSE RESULT: 5.7 % — HIGH (ref 4–5.6)
ALBUMIN SERPL ELPH-MCNC: 4.4 G/DL — SIGNIFICANT CHANGE UP (ref 3.3–5.2)
ALP SERPL-CCNC: 93 U/L — SIGNIFICANT CHANGE UP (ref 40–120)
ALT FLD-CCNC: 19 U/L — SIGNIFICANT CHANGE UP
ANION GAP SERPL CALC-SCNC: 13 MMOL/L — SIGNIFICANT CHANGE UP (ref 5–17)
APPEARANCE UR: CLEAR — SIGNIFICANT CHANGE UP
APTT BLD: 30.2 SEC — SIGNIFICANT CHANGE UP (ref 27.5–35.5)
AST SERPL-CCNC: 25 U/L — SIGNIFICANT CHANGE UP
BACTERIA # UR AUTO: ABNORMAL
BASOPHILS # BLD AUTO: 0.04 K/UL — SIGNIFICANT CHANGE UP (ref 0–0.2)
BASOPHILS NFR BLD AUTO: 0.7 % — SIGNIFICANT CHANGE UP (ref 0–2)
BILIRUB SERPL-MCNC: 0.5 MG/DL — SIGNIFICANT CHANGE UP (ref 0.4–2)
BILIRUB UR-MCNC: NEGATIVE — SIGNIFICANT CHANGE UP
BLD GP AB SCN SERPL QL: SIGNIFICANT CHANGE UP
BUN SERPL-MCNC: 14.7 MG/DL — SIGNIFICANT CHANGE UP (ref 8–20)
CALCIUM SERPL-MCNC: 9.3 MG/DL — SIGNIFICANT CHANGE UP (ref 8.6–10.2)
CHLORIDE SERPL-SCNC: 102 MMOL/L — SIGNIFICANT CHANGE UP (ref 98–107)
CO2 SERPL-SCNC: 23 MMOL/L — SIGNIFICANT CHANGE UP (ref 22–29)
COLOR SPEC: SIGNIFICANT CHANGE UP
CREAT SERPL-MCNC: 0.84 MG/DL — SIGNIFICANT CHANGE UP (ref 0.5–1.3)
DIFF PNL FLD: ABNORMAL
EOSINOPHIL # BLD AUTO: 0.07 K/UL — SIGNIFICANT CHANGE UP (ref 0–0.5)
EOSINOPHIL NFR BLD AUTO: 1.3 % — SIGNIFICANT CHANGE UP (ref 0–6)
EPI CELLS # UR: NEGATIVE — SIGNIFICANT CHANGE UP
ESTIMATED AVERAGE GLUCOSE: 117 MG/DL — HIGH (ref 68–114)
GLUCOSE SERPL-MCNC: 100 MG/DL — HIGH (ref 70–99)
GLUCOSE UR QL: NEGATIVE MG/DL — SIGNIFICANT CHANGE UP
HCT VFR BLD CALC: 46.3 % — SIGNIFICANT CHANGE UP (ref 39–50)
HGB BLD-MCNC: 16.1 G/DL — SIGNIFICANT CHANGE UP (ref 13–17)
IMM GRANULOCYTES NFR BLD AUTO: 0.2 % — SIGNIFICANT CHANGE UP (ref 0–1.5)
INR BLD: 1.02 RATIO — SIGNIFICANT CHANGE UP (ref 0.88–1.16)
KETONES UR-MCNC: NEGATIVE — SIGNIFICANT CHANGE UP
LEUKOCYTE ESTERASE UR-ACNC: NEGATIVE — SIGNIFICANT CHANGE UP
LYMPHOCYTES # BLD AUTO: 1.23 K/UL — SIGNIFICANT CHANGE UP (ref 1–3.3)
LYMPHOCYTES # BLD AUTO: 23 % — SIGNIFICANT CHANGE UP (ref 13–44)
MCHC RBC-ENTMCNC: 31.3 PG — SIGNIFICANT CHANGE UP (ref 27–34)
MCHC RBC-ENTMCNC: 34.8 GM/DL — SIGNIFICANT CHANGE UP (ref 32–36)
MCV RBC AUTO: 89.9 FL — SIGNIFICANT CHANGE UP (ref 80–100)
MONOCYTES # BLD AUTO: 0.36 K/UL — SIGNIFICANT CHANGE UP (ref 0–0.9)
MONOCYTES NFR BLD AUTO: 6.7 % — SIGNIFICANT CHANGE UP (ref 2–14)
MRSA PCR RESULT.: SIGNIFICANT CHANGE UP
NEUTROPHILS # BLD AUTO: 3.63 K/UL — SIGNIFICANT CHANGE UP (ref 1.8–7.4)
NEUTROPHILS NFR BLD AUTO: 68.1 % — SIGNIFICANT CHANGE UP (ref 43–77)
NITRITE UR-MCNC: NEGATIVE — SIGNIFICANT CHANGE UP
NT-PROBNP SERPL-SCNC: 291 PG/ML — SIGNIFICANT CHANGE UP (ref 0–300)
PH UR: 6.5 — SIGNIFICANT CHANGE UP (ref 5–8)
PLATELET # BLD AUTO: 209 K/UL — SIGNIFICANT CHANGE UP (ref 150–400)
POTASSIUM SERPL-MCNC: 4.8 MMOL/L — SIGNIFICANT CHANGE UP (ref 3.5–5.3)
POTASSIUM SERPL-SCNC: 4.8 MMOL/L — SIGNIFICANT CHANGE UP (ref 3.5–5.3)
PREALB SERPL-MCNC: 24 MG/DL — SIGNIFICANT CHANGE UP (ref 18–38)
PROT SERPL-MCNC: 7.2 G/DL — SIGNIFICANT CHANGE UP (ref 6.6–8.7)
PROT UR-MCNC: NEGATIVE MG/DL — SIGNIFICANT CHANGE UP
PROTHROM AB SERPL-ACNC: 11.8 SEC — SIGNIFICANT CHANGE UP (ref 10.6–13.6)
RBC # BLD: 5.15 M/UL — SIGNIFICANT CHANGE UP (ref 4.2–5.8)
RBC # FLD: 12.1 % — SIGNIFICANT CHANGE UP (ref 10.3–14.5)
RBC CASTS # UR COMP ASSIST: SIGNIFICANT CHANGE UP /HPF (ref 0–4)
S AUREUS DNA NOSE QL NAA+PROBE: DETECTED
SODIUM SERPL-SCNC: 138 MMOL/L — SIGNIFICANT CHANGE UP (ref 135–145)
SP GR SPEC: 1.01 — SIGNIFICANT CHANGE UP (ref 1.01–1.02)
T3 SERPL-MCNC: 127 NG/DL — SIGNIFICANT CHANGE UP (ref 80–200)
T4 AB SER-ACNC: 7.4 UG/DL — SIGNIFICANT CHANGE UP (ref 4.5–12)
TSH SERPL-MCNC: 2.3 UIU/ML — SIGNIFICANT CHANGE UP (ref 0.27–4.2)
UROBILINOGEN FLD QL: NEGATIVE MG/DL — SIGNIFICANT CHANGE UP
WBC # BLD: 5.34 K/UL — SIGNIFICANT CHANGE UP (ref 3.8–10.5)
WBC # FLD AUTO: 5.34 K/UL — SIGNIFICANT CHANGE UP (ref 3.8–10.5)
WBC UR QL: SIGNIFICANT CHANGE UP

## 2021-09-27 PROCEDURE — 94729 DIFFUSING CAPACITY: CPT

## 2021-09-27 PROCEDURE — 93005 ELECTROCARDIOGRAM TRACING: CPT

## 2021-09-27 PROCEDURE — 85018 HEMOGLOBIN: CPT | Mod: QW

## 2021-09-27 PROCEDURE — 94010 BREATHING CAPACITY TEST: CPT

## 2021-09-27 PROCEDURE — 93010 ELECTROCARDIOGRAM REPORT: CPT

## 2021-09-27 PROCEDURE — 71046 X-RAY EXAM CHEST 2 VIEWS: CPT | Mod: 26

## 2021-09-27 PROCEDURE — 94727 GAS DIL/WSHOT DETER LNG VOL: CPT

## 2021-09-27 PROCEDURE — G0463: CPT

## 2021-09-27 PROCEDURE — 71046 X-RAY EXAM CHEST 2 VIEWS: CPT

## 2021-09-27 NOTE — PATIENT PROFILE ADULT - ..
Endometrial biopsy as part of the fertility evaluation was performed under semi sterile conditions.  After the speculum was inserted in the vagina the cervix was cleaned with sterile water.  Then the Pipelle was introduced through the cervical canal into the uterine cavity and the biopsy was performed for endometrial receptive assay and receptive a.    27-Sep-2021 13:43:31

## 2021-09-27 NOTE — H&P PST ADULT - NSICDXFAMILYHX_GEN_ALL_CORE_FT
FAMILY HISTORY:  Father  Still living? Unknown  Family history of lung cancer, Age at diagnosis: Age Unknown  FH: pancreatic cancer, Age at diagnosis: Age Unknown    Mother  Still living? Unknown  FH: pancreatic cancer, Age at diagnosis: Age Unknown    Sibling  Still living? Unknown  FHx: ovarian cancer, Age at diagnosis: Age Unknown

## 2021-09-27 NOTE — H&P PST ADULT - PROBLEM SELECTOR PLAN 1
Aortic valve replacement on 10/7/2021 with Dr. Peck  Patient educated on written and verbal preop instructions.    Medications reviewed, instructions given on what medications to take and what not to take.  Pt instructed to stop Herbals or anti-inflammatory meds one week prior to surgery and stop ASA on 10/4 per cardio

## 2021-09-27 NOTE — H&P PST ADULT - NSICDXPASTMEDICALHX_GEN_ALL_CORE_FT
PAST MEDICAL HISTORY:  Exposure to Agent Orange     H/O aortic valve insufficiency     History of radiation therapy     HLD (hyperlipidemia)     HTN (hypertension)     Lesion of right lung     Prostate cancer     Testicular cancer      PAST MEDICAL HISTORY:  Exposure to Agent Orange     H/O aortic valve insufficiency     History of abdominal aortic aneurysm (AAA)     History of radiation therapy     HLD (hyperlipidemia)     HTN (hypertension)     Lesion of right lung right lung adenocarcinoma    Prostate cancer     Testicular cancer

## 2021-09-27 NOTE — H&P PST ADULT - ASSESSMENT
CAPRINI SCORE [CLOT]    AGE RELATED RISK FACTORS                                                       MOBILITY RELATED FACTORS  [ ] Age 41-60 years                                            (1 Point)                  [ ] Bed rest                                                        (1 Point)  [ x] Age: 61-74 years                                           (2 Points)                 [ ] Plaster cast                                                   (2 Points)  [ ] Age= 75 years                                              (3 Points)                   [ ] Bed bound for more than 72 hours                 (2 Points)    DISEASE RELATED RISK FACTORS                                               GENDER SPECIFIC FACTORS  [ ] Edema in the lower extremities                       (1 Point)              [ ] Pregnancy                                                     (1 Point)  [ x] Varicose veins                                               (1 Point)                     [ ] Post-partum < 6 weeks                                   (1 Point)             [x ] BMI > 25 Kg/m2                                            (1 Point)                     [ ] Hormonal therapy  or oral contraception          (1 Point)                 [ ] Sepsis (in the previous month)                        (1 Point)                [ ] History of pregnancy complications                 (1 point)  [ ] Pneumonia or serious lung disease                                                [ ] Unexplained or recurrent                     (1 Point)           (in the previous month)                               (1 Point)  [ ] Abnormal pulmonary function test                     (1 Point)                 SURGERY RELATED RISK FACTORS  [ ] Acute myocardial infarction                              (1 Point)                   [ ]  Section                                             (1 Point)  [ ] Congestive heart failure (in the previous month)  (1 Point)           [ ] Minor surgery                                                  (1 Point)   [ ] Inflammatory bowel disease                             (1 Point)                   [ ] Arthroscopic surgery                                        (2 Points)  [ ] Central venous access                                      (2 Points)                    [ ]x General surgery lasting more than 45 minutes   (2 Points)       [ ] Stroke (in the previous month)                          (5 Points)                 [ ] Elective arthroplasty                                         (5 Points)            [ x] Malignacy (past or present)                          (2 ponits)                                                                                                                            HEMATOLOGY RELATED FACTORS                                                 TRAUMA RELATED RISK FACTORS  [ ] Prior episodes of VTE                                     (3 Points)                [ ] Fracture of the hip, pelvis, or leg                       (5 Points)  [ ] Positive family history for VTE                         (3 Points)             [ ] Acute spinal cord injury (in the previous month)  (5 Points)  [ ] Prothrombin 61385 A                                     (3 Points)                [ ] Paralysis  (less than 1 month)                             (5 Points)  [ ] Factor V Leiden                                             (3 Points)                   [ ] Multiple Trauma within 1 month                        (5 Points)  [ ] Lupus anticoagulants                                     (3 Points)                                                           [ ] Anticardiolipin antibodies                               (3 Points)                                                       [ ] High homocysteine in the blood                      (3 Points)                                             [ ] Other congenital or acquired thrombophilia      (3 Points)                                                [ ] Heparin induced thrombocytopenia                  (3 Points)                                          Total Score [  8        ]    Caprini Score 0 - 2:  Low Risk, No VTE Prophylaxis required for most patients, encourage ambulation  Caprini Score 3 - 6:  At Risk, pharmacologic VTE prophylaxis is indicated for most patients (in the absence of a contraindication)  Caprini Score Greater than or = 7:  High Risk, pharmacologic VTE prophylaxis is indicated for most patients (in the absence of a contraindication)    70 year old male with HTN, prostate cancer, testicular ca, right lung adenocarcinoma s/p chemo and xRT, PFO, AAA, and severe aortic stenosis present today for PST.   Recent imaging and work up revealed severe aortic stenosis. He is now schedule for Aortic valve replacement on 10/7/2021 with Dr. Peck  Patient educated on written and verbal preop instructions.    Patient verbalized understanding after "teach back" method of instruction were given   Medications reviewed, instructions given on what medications to take and what not to take.  Pt instructed to stop Herbals or anti-inflammatory meds one week prior to surgery and discuss with PMD.  stop ASA on 10/4 per cardio

## 2021-09-27 NOTE — H&P PST ADULT - NSICDXPASTSURGICALHX_GEN_ALL_CORE_FT
PAST SURGICAL HISTORY:  H/O removal of testicle left    History of chemotherapy     History of prostate surgery      PAST SURGICAL HISTORY:  Encounter for management of wound VAC left ankle wound debridment    H/O removal of testicle left    History of chemotherapy     History of prostate surgery

## 2021-09-27 NOTE — H&P PST ADULT - HISTORY OF PRESENT ILLNESS
70 year old male with HTN, prostate cancer, testicular ca, right lung adenocarcinoma s/p chemo and xRT, PFO, AAA, and severe aortic stenosis present today for PST.  He had an abnormal EKG with changes compared to previous one a few years ago which prompted further cardiac evaluation. He is status post radiation treatment and chemotherapy for right-sided lung adenocarcinoma.  Recent imaging and work up revealed severe aortic stenosis.  He report c/o dizziness first thing in the morning and sometimes when getting up from a seated position. denies fatigue or chest pain. He is now schedule for Aortic valve replacement on 10/7/2021 with Dr. Peck      Cardiac Catheterization from 6/28/21 at [facility]  - Normal Coronary Arteries    Transthoracic Echocardiogram from 7/20/21 at [facility]   - LVEF 45%  - There is moderate LVH  - Moderately enlarged left atrium  - Dilation of the ascending aorta and root.   - Mild thickening and calcification of the anterior and posterior mitral valve leaflets, no evidence of mitral valve regurgitation.  - Peak transaortic gradient equals 28.2 mmHg, MAVG 14.4 mmHg, the calculated aortic valve area equals 0.95 cm2, by the continuity equation consistent with severe aortic stenosis. The dimensionless Index value is 0.24.   Echocardiogram in April 2021 showed significant progression of aortic stenosis to severe. DVI 0.2. Aortic valve area 0.6 cm². CT arctic valve calcium score more than 2500.     Abdominal aortic ultrasound also showed mild dilation of proximal aorta to 3.2 cm. This is unchanged from August 2019. He is asymptomatic for dilated abdominal aorta  - EKG 08/07/19: Sinus rhythm. T. inversion in inferior leads and V6.  - Echocardiogram September 2016: Normal LV function. Mild aortic stenosis. Aortic root 4.7 cm. Ascending aorta 4.4 cm   - abdominal ultrasound September 2016: Proximal aorta 3.0 cm.

## 2021-09-29 DIAGNOSIS — A49.01 METHICILLIN SUSCEPTIBLE STAPHYLOCOCCUS AUREUS INFECTION, UNSPECIFIED SITE: ICD-10-CM

## 2021-10-04 ENCOUNTER — APPOINTMENT (OUTPATIENT)
Dept: DISASTER EMERGENCY | Facility: CLINIC | Age: 71
End: 2021-10-04

## 2021-10-05 LAB — SARS-COV-2 N GENE NPH QL NAA+PROBE: NOT DETECTED

## 2021-10-07 ENCOUNTER — RESULT REVIEW (OUTPATIENT)
Age: 71
End: 2021-10-07

## 2021-10-07 ENCOUNTER — APPOINTMENT (OUTPATIENT)
Dept: CARDIOTHORACIC SURGERY | Facility: HOSPITAL | Age: 71
End: 2021-10-07

## 2021-10-07 ENCOUNTER — INPATIENT (INPATIENT)
Facility: HOSPITAL | Age: 71
LOS: 4 days | Discharge: HOME | DRG: 220 | End: 2021-10-12
Attending: THORACIC SURGERY (CARDIOTHORACIC VASCULAR SURGERY) | Admitting: THORACIC SURGERY (CARDIOTHORACIC VASCULAR SURGERY)
Payer: MEDICARE

## 2021-10-07 VITALS
HEIGHT: 70 IN | SYSTOLIC BLOOD PRESSURE: 132 MMHG | HEART RATE: 64 BPM | TEMPERATURE: 98 F | WEIGHT: 214.07 LBS | OXYGEN SATURATION: 98 % | DIASTOLIC BLOOD PRESSURE: 71 MMHG | RESPIRATION RATE: 16 BRPM

## 2021-10-07 DIAGNOSIS — Z92.21 PERSONAL HISTORY OF ANTINEOPLASTIC CHEMOTHERAPY: Chronic | ICD-10-CM

## 2021-10-07 DIAGNOSIS — I35.0 NONRHEUMATIC AORTIC (VALVE) STENOSIS: ICD-10-CM

## 2021-10-07 DIAGNOSIS — Z46.89 ENCOUNTER FOR FITTING AND ADJUSTMENT OF OTHER SPECIFIED DEVICES: Chronic | ICD-10-CM

## 2021-10-07 DIAGNOSIS — Z90.79 ACQUIRED ABSENCE OF OTHER GENITAL ORGAN(S): Chronic | ICD-10-CM

## 2021-10-07 DIAGNOSIS — Z98.890 OTHER SPECIFIED POSTPROCEDURAL STATES: Chronic | ICD-10-CM

## 2021-10-07 LAB
ABO RH CONFIRMATION: SIGNIFICANT CHANGE UP
ALBUMIN SERPL ELPH-MCNC: 3.6 G/DL — SIGNIFICANT CHANGE UP (ref 3.3–5.2)
ALP SERPL-CCNC: 63 U/L — SIGNIFICANT CHANGE UP (ref 40–120)
ALT FLD-CCNC: 12 U/L — SIGNIFICANT CHANGE UP
ANION GAP SERPL CALC-SCNC: 16 MMOL/L — SIGNIFICANT CHANGE UP (ref 5–17)
APTT BLD: 53.5 SEC — HIGH (ref 27.5–35.5)
AST SERPL-CCNC: 26 U/L — SIGNIFICANT CHANGE UP
BILIRUB SERPL-MCNC: 1 MG/DL — SIGNIFICANT CHANGE UP (ref 0.4–2)
BUN SERPL-MCNC: 13.8 MG/DL — SIGNIFICANT CHANGE UP (ref 8–20)
CALCIUM SERPL-MCNC: 8.3 MG/DL — LOW (ref 8.6–10.2)
CHLORIDE SERPL-SCNC: 101 MMOL/L — SIGNIFICANT CHANGE UP (ref 98–107)
CK MB CFR SERPL CALC: 17.3 NG/ML — HIGH (ref 0–6.7)
CK SERPL-CCNC: 200 U/L — SIGNIFICANT CHANGE UP (ref 30–200)
CO2 SERPL-SCNC: 20 MMOL/L — LOW (ref 22–29)
CREAT SERPL-MCNC: 0.66 MG/DL — SIGNIFICANT CHANGE UP (ref 0.5–1.3)
GAS PNL BLDA: SIGNIFICANT CHANGE UP
GLUCOSE BLDC GLUCOMTR-MCNC: 101 MG/DL — HIGH (ref 70–99)
GLUCOSE BLDC GLUCOMTR-MCNC: 117 MG/DL — HIGH (ref 70–99)
GLUCOSE BLDC GLUCOMTR-MCNC: 119 MG/DL — HIGH (ref 70–99)
GLUCOSE BLDC GLUCOMTR-MCNC: 120 MG/DL — HIGH (ref 70–99)
GLUCOSE BLDC GLUCOMTR-MCNC: 129 MG/DL — HIGH (ref 70–99)
GLUCOSE BLDC GLUCOMTR-MCNC: 135 MG/DL — HIGH (ref 70–99)
GLUCOSE BLDC GLUCOMTR-MCNC: 150 MG/DL — HIGH (ref 70–99)
GLUCOSE BLDC GLUCOMTR-MCNC: 151 MG/DL — HIGH (ref 70–99)
GLUCOSE BLDC GLUCOMTR-MCNC: 158 MG/DL — HIGH (ref 70–99)
GLUCOSE BLDC GLUCOMTR-MCNC: 160 MG/DL — HIGH (ref 70–99)
GLUCOSE BLDC GLUCOMTR-MCNC: 161 MG/DL — HIGH (ref 70–99)
GLUCOSE SERPL-MCNC: 173 MG/DL — HIGH (ref 70–99)
HCT VFR BLD CALC: 36.3 % — LOW (ref 39–50)
HGB BLD-MCNC: 12.6 G/DL — LOW (ref 13–17)
INR BLD: 1.27 RATIO — HIGH (ref 0.88–1.16)
MAGNESIUM SERPL-MCNC: 2.2 MG/DL — SIGNIFICANT CHANGE UP (ref 1.6–2.6)
MCHC RBC-ENTMCNC: 30.7 PG — SIGNIFICANT CHANGE UP (ref 27–34)
MCHC RBC-ENTMCNC: 34.7 GM/DL — SIGNIFICANT CHANGE UP (ref 32–36)
MCV RBC AUTO: 88.5 FL — SIGNIFICANT CHANGE UP (ref 80–100)
PLATELET # BLD AUTO: 124 K/UL — LOW (ref 150–400)
POTASSIUM SERPL-MCNC: 4.5 MMOL/L — SIGNIFICANT CHANGE UP (ref 3.5–5.3)
POTASSIUM SERPL-SCNC: 4.5 MMOL/L — SIGNIFICANT CHANGE UP (ref 3.5–5.3)
PROT SERPL-MCNC: 5.2 G/DL — LOW (ref 6.6–8.7)
PROTHROM AB SERPL-ACNC: 14.6 SEC — HIGH (ref 10.6–13.6)
RBC # BLD: 4.1 M/UL — LOW (ref 4.2–5.8)
RBC # FLD: 12.2 % — SIGNIFICANT CHANGE UP (ref 10.3–14.5)
SODIUM SERPL-SCNC: 137 MMOL/L — SIGNIFICANT CHANGE UP (ref 135–145)
TROPONIN T SERPL-MCNC: 0.25 NG/ML — HIGH (ref 0–0.06)
WBC # BLD: 10.66 K/UL — HIGH (ref 3.8–10.5)
WBC # FLD AUTO: 10.66 K/UL — HIGH (ref 3.8–10.5)

## 2021-10-07 PROCEDURE — 71045 X-RAY EXAM CHEST 1 VIEW: CPT | Mod: 26

## 2021-10-07 PROCEDURE — 33405 REPLACEMENT AORTIC VALVE OPN: CPT

## 2021-10-07 PROCEDURE — 33405 REPLACEMENT AORTIC VALVE OPN: CPT | Mod: AS

## 2021-10-07 PROCEDURE — 93010 ELECTROCARDIOGRAM REPORT: CPT

## 2021-10-07 PROCEDURE — 88311 DECALCIFY TISSUE: CPT | Mod: 26

## 2021-10-07 PROCEDURE — 88305 TISSUE EXAM BY PATHOLOGIST: CPT | Mod: 26

## 2021-10-07 RX ORDER — SODIUM CHLORIDE 9 MG/ML
1000 INJECTION, SOLUTION INTRAVENOUS ONCE
Refills: 0 | Status: COMPLETED | OUTPATIENT
Start: 2021-10-07 | End: 2021-10-07

## 2021-10-07 RX ORDER — PROTAMINE SULFATE 10 MG/ML
50 AMPUL (ML) INTRAVENOUS ONCE
Refills: 0 | Status: COMPLETED | OUTPATIENT
Start: 2021-10-07 | End: 2021-10-07

## 2021-10-07 RX ORDER — SENNA PLUS 8.6 MG/1
2 TABLET ORAL AT BEDTIME
Refills: 0 | Status: DISCONTINUED | OUTPATIENT
Start: 2021-10-07 | End: 2021-10-12

## 2021-10-07 RX ORDER — PANTOPRAZOLE SODIUM 20 MG/1
40 TABLET, DELAYED RELEASE ORAL DAILY
Refills: 0 | Status: DISCONTINUED | OUTPATIENT
Start: 2021-10-08 | End: 2021-10-12

## 2021-10-07 RX ORDER — POLYETHYLENE GLYCOL 3350 17 G/17G
17 POWDER, FOR SOLUTION ORAL DAILY
Refills: 0 | Status: DISCONTINUED | OUTPATIENT
Start: 2021-10-09 | End: 2021-10-12

## 2021-10-07 RX ORDER — CEFUROXIME AXETIL 250 MG
1500 TABLET ORAL ONCE
Refills: 0 | Status: DISCONTINUED | OUTPATIENT
Start: 2021-10-07 | End: 2021-10-07

## 2021-10-07 RX ORDER — INSULIN HUMAN 100 [IU]/ML
2 INJECTION, SOLUTION SUBCUTANEOUS
Qty: 50 | Refills: 0 | Status: DISCONTINUED | OUTPATIENT
Start: 2021-10-07 | End: 2021-10-08

## 2021-10-07 RX ORDER — CHLORHEXIDINE GLUCONATE 213 G/1000ML
1 SOLUTION TOPICAL DAILY
Refills: 0 | Status: DISCONTINUED | OUTPATIENT
Start: 2021-10-07 | End: 2021-10-09

## 2021-10-07 RX ORDER — CEFUROXIME AXETIL 250 MG
1500 TABLET ORAL EVERY 8 HOURS
Refills: 0 | Status: COMPLETED | OUTPATIENT
Start: 2021-10-07 | End: 2021-10-09

## 2021-10-07 RX ORDER — POTASSIUM CHLORIDE 20 MEQ
10 PACKET (EA) ORAL
Refills: 0 | Status: DISCONTINUED | OUTPATIENT
Start: 2021-10-07 | End: 2021-10-08

## 2021-10-07 RX ORDER — ATORVASTATIN CALCIUM 80 MG/1
20 TABLET, FILM COATED ORAL AT BEDTIME
Refills: 0 | Status: DISCONTINUED | OUTPATIENT
Start: 2021-10-07 | End: 2021-10-12

## 2021-10-07 RX ORDER — NOREPINEPHRINE BITARTRATE/D5W 8 MG/250ML
0.05 PLASTIC BAG, INJECTION (ML) INTRAVENOUS
Qty: 8 | Refills: 0 | Status: DISCONTINUED | OUTPATIENT
Start: 2021-10-07 | End: 2021-10-08

## 2021-10-07 RX ORDER — CALCIUM GLUCONATE 100 MG/ML
2 VIAL (ML) INTRAVENOUS ONCE
Refills: 0 | Status: COMPLETED | OUTPATIENT
Start: 2021-10-07 | End: 2021-10-07

## 2021-10-07 RX ORDER — ASPIRIN/CALCIUM CARB/MAGNESIUM 324 MG
325 TABLET ORAL DAILY
Refills: 0 | Status: DISCONTINUED | OUTPATIENT
Start: 2021-10-08 | End: 2021-10-12

## 2021-10-07 RX ORDER — PANTOPRAZOLE SODIUM 20 MG/1
40 TABLET, DELAYED RELEASE ORAL ONCE
Refills: 0 | Status: COMPLETED | OUTPATIENT
Start: 2021-10-07 | End: 2021-10-07

## 2021-10-07 RX ORDER — POTASSIUM CHLORIDE 20 MEQ
10 PACKET (EA) ORAL
Refills: 0 | Status: COMPLETED | OUTPATIENT
Start: 2021-10-07 | End: 2021-10-07

## 2021-10-07 RX ORDER — SODIUM CHLORIDE 9 MG/ML
1000 INJECTION INTRAMUSCULAR; INTRAVENOUS; SUBCUTANEOUS
Refills: 0 | Status: DISCONTINUED | OUTPATIENT
Start: 2021-10-07 | End: 2021-10-08

## 2021-10-07 RX ORDER — DEXTROSE 50 % IN WATER 50 %
50 SYRINGE (ML) INTRAVENOUS
Refills: 0 | Status: DISCONTINUED | OUTPATIENT
Start: 2021-10-07 | End: 2021-10-08

## 2021-10-07 RX ORDER — DEXTROSE 50 % IN WATER 50 %
25 SYRINGE (ML) INTRAVENOUS
Refills: 0 | Status: DISCONTINUED | OUTPATIENT
Start: 2021-10-07 | End: 2021-10-08

## 2021-10-07 RX ORDER — SODIUM CHLORIDE 9 MG/ML
3 INJECTION INTRAMUSCULAR; INTRAVENOUS; SUBCUTANEOUS EVERY 8 HOURS
Refills: 0 | Status: DISCONTINUED | OUTPATIENT
Start: 2021-10-07 | End: 2021-10-07

## 2021-10-07 RX ORDER — VANCOMYCIN HCL 1 G
1500 VIAL (EA) INTRAVENOUS EVERY 12 HOURS
Refills: 0 | Status: COMPLETED | OUTPATIENT
Start: 2021-10-07 | End: 2021-10-09

## 2021-10-07 RX ORDER — ONDANSETRON 8 MG/1
4 TABLET, FILM COATED ORAL EVERY 4 HOURS
Refills: 0 | Status: DISCONTINUED | OUTPATIENT
Start: 2021-10-07 | End: 2021-10-12

## 2021-10-07 RX ORDER — FENTANYL CITRATE 50 UG/ML
50 INJECTION INTRAVENOUS
Refills: 0 | Status: DISCONTINUED | OUTPATIENT
Start: 2021-10-07 | End: 2021-10-08

## 2021-10-07 RX ORDER — VASOPRESSIN 20 [USP'U]/ML
0.04 INJECTION INTRAVENOUS
Qty: 50 | Refills: 0 | Status: DISCONTINUED | OUTPATIENT
Start: 2021-10-07 | End: 2021-10-08

## 2021-10-07 RX ORDER — ALBUMIN HUMAN 25 %
250 VIAL (ML) INTRAVENOUS ONCE
Refills: 0 | Status: COMPLETED | OUTPATIENT
Start: 2021-10-07 | End: 2021-10-07

## 2021-10-07 RX ORDER — PROPOFOL 10 MG/ML
5 INJECTION, EMULSION INTRAVENOUS
Qty: 1000 | Refills: 0 | Status: DISCONTINUED | OUTPATIENT
Start: 2021-10-07 | End: 2021-10-07

## 2021-10-07 RX ORDER — ACETAMINOPHEN 500 MG
1000 TABLET ORAL ONCE
Refills: 0 | Status: COMPLETED | OUTPATIENT
Start: 2021-10-07 | End: 2021-10-07

## 2021-10-07 RX ADMIN — SODIUM CHLORIDE 5 MILLILITER(S): 9 INJECTION INTRAMUSCULAR; INTRAVENOUS; SUBCUTANEOUS at 12:00

## 2021-10-07 RX ADMIN — CHLORHEXIDINE GLUCONATE 1 APPLICATION(S): 213 SOLUTION TOPICAL at 14:45

## 2021-10-07 RX ADMIN — FENTANYL CITRATE 50 MICROGRAM(S): 50 INJECTION INTRAVENOUS at 18:45

## 2021-10-07 RX ADMIN — Medication 1000 MILLIGRAM(S): at 18:38

## 2021-10-07 RX ADMIN — VASOPRESSIN 2.4 UNIT(S)/MIN: 20 INJECTION INTRAVENOUS at 12:51

## 2021-10-07 RX ADMIN — Medication 200 GRAM(S): at 21:03

## 2021-10-07 RX ADMIN — SODIUM CHLORIDE 1000 MILLILITER(S): 9 INJECTION, SOLUTION INTRAVENOUS at 12:22

## 2021-10-07 RX ADMIN — SODIUM CHLORIDE 10 MILLILITER(S): 9 INJECTION INTRAMUSCULAR; INTRAVENOUS; SUBCUTANEOUS at 12:00

## 2021-10-07 RX ADMIN — Medication 100 MILLIEQUIVALENT(S): at 12:51

## 2021-10-07 RX ADMIN — Medication 400 MILLIGRAM(S): at 18:01

## 2021-10-07 RX ADMIN — Medication 100 MILLIGRAM(S): at 16:12

## 2021-10-07 RX ADMIN — PANTOPRAZOLE SODIUM 40 MILLIGRAM(S): 20 TABLET, DELAYED RELEASE ORAL at 12:24

## 2021-10-07 RX ADMIN — INSULIN HUMAN 2 UNIT(S)/HR: 100 INJECTION, SOLUTION SUBCUTANEOUS at 13:40

## 2021-10-07 RX ADMIN — SENNA PLUS 2 TABLET(S): 8.6 TABLET ORAL at 21:04

## 2021-10-07 RX ADMIN — Medication 330 MILLIGRAM(S): at 12:15

## 2021-10-07 RX ADMIN — SODIUM CHLORIDE 1000 MILLILITER(S): 9 INJECTION, SOLUTION INTRAVENOUS at 12:52

## 2021-10-07 RX ADMIN — FENTANYL CITRATE 50 MICROGRAM(S): 50 INJECTION INTRAVENOUS at 19:00

## 2021-10-07 RX ADMIN — Medication 300 MILLIGRAM(S): at 19:20

## 2021-10-07 RX ADMIN — Medication 125 MILLILITER(S): at 13:05

## 2021-10-07 RX ADMIN — ATORVASTATIN CALCIUM 20 MILLIGRAM(S): 80 TABLET, FILM COATED ORAL at 21:04

## 2021-10-07 NOTE — BRIEF OPERATIVE NOTE - NSICDXBRIEFPOSTOP_GEN_ALL_CORE_FT
Per [facility]  POST-OP DIAGNOSIS:  Aortic stenosis 07-Oct-2021 11:33:51  Nehemiah Mack  Aortic insufficiency 07-Oct-2021 11:34:05  Nehemiah Mack

## 2021-10-07 NOTE — BRIEF OPERATIVE NOTE - NSICDXBRIEFPREOP_GEN_ALL_CORE_FT
PRE-OP DIAGNOSIS:  Aortic stenosis 07-Oct-2021 11:33:19  Nehemiah Mack  Aortic insufficiency 07-Oct-2021 11:33:33  Nehemiah Mack

## 2021-10-08 ENCOUNTER — NON-APPOINTMENT (OUTPATIENT)
Age: 71
End: 2021-10-08

## 2021-10-08 DIAGNOSIS — C62.90 MALIGNANT NEOPLASM OF UNSPECIFIED TESTIS, UNSPECIFIED WHETHER DESCENDED OR UNDESCENDED: ICD-10-CM

## 2021-10-08 DIAGNOSIS — E78.5 HYPERLIPIDEMIA, UNSPECIFIED: ICD-10-CM

## 2021-10-08 DIAGNOSIS — Z86.79 PERSONAL HISTORY OF OTHER DISEASES OF THE CIRCULATORY SYSTEM: ICD-10-CM

## 2021-10-08 DIAGNOSIS — C34.91 MALIGNANT NEOPLASM OF UNSPECIFIED PART OF RIGHT BRONCHUS OR LUNG: ICD-10-CM

## 2021-10-08 LAB
ALBUMIN SERPL ELPH-MCNC: 3.5 G/DL — SIGNIFICANT CHANGE UP (ref 3.3–5.2)
ALP SERPL-CCNC: 56 U/L — SIGNIFICANT CHANGE UP (ref 40–120)
ALT FLD-CCNC: 13 U/L — SIGNIFICANT CHANGE UP
ANION GAP SERPL CALC-SCNC: 10 MMOL/L — SIGNIFICANT CHANGE UP (ref 5–17)
AST SERPL-CCNC: 26 U/L — SIGNIFICANT CHANGE UP
BILIRUB SERPL-MCNC: 0.6 MG/DL — SIGNIFICANT CHANGE UP (ref 0.4–2)
BUN SERPL-MCNC: 12.6 MG/DL — SIGNIFICANT CHANGE UP (ref 8–20)
CALCIUM SERPL-MCNC: 8.2 MG/DL — LOW (ref 8.6–10.2)
CHLORIDE SERPL-SCNC: 105 MMOL/L — SIGNIFICANT CHANGE UP (ref 98–107)
CK MB CFR SERPL CALC: 11.3 NG/ML — HIGH (ref 0–6.7)
CK SERPL-CCNC: 268 U/L — HIGH (ref 30–200)
CO2 SERPL-SCNC: 22 MMOL/L — SIGNIFICANT CHANGE UP (ref 22–29)
COVID-19 SPIKE DOMAIN AB INTERP: POSITIVE
COVID-19 SPIKE DOMAIN ANTIBODY RESULT: >250 U/ML — HIGH
CREAT SERPL-MCNC: 0.76 MG/DL — SIGNIFICANT CHANGE UP (ref 0.5–1.3)
GLUCOSE BLDC GLUCOMTR-MCNC: 124 MG/DL — HIGH (ref 70–99)
GLUCOSE BLDC GLUCOMTR-MCNC: 127 MG/DL — HIGH (ref 70–99)
GLUCOSE BLDC GLUCOMTR-MCNC: 128 MG/DL — HIGH (ref 70–99)
GLUCOSE BLDC GLUCOMTR-MCNC: 129 MG/DL — HIGH (ref 70–99)
GLUCOSE BLDC GLUCOMTR-MCNC: 130 MG/DL — HIGH (ref 70–99)
GLUCOSE BLDC GLUCOMTR-MCNC: 132 MG/DL — HIGH (ref 70–99)
GLUCOSE BLDC GLUCOMTR-MCNC: 135 MG/DL — HIGH (ref 70–99)
GLUCOSE BLDC GLUCOMTR-MCNC: 137 MG/DL — HIGH (ref 70–99)
GLUCOSE BLDC GLUCOMTR-MCNC: 143 MG/DL — HIGH (ref 70–99)
GLUCOSE SERPL-MCNC: 129 MG/DL — HIGH (ref 70–99)
HCT VFR BLD CALC: 33.4 % — LOW (ref 39–50)
HGB BLD-MCNC: 11.7 G/DL — LOW (ref 13–17)
MAGNESIUM SERPL-MCNC: 1.8 MG/DL — SIGNIFICANT CHANGE UP (ref 1.6–2.6)
MCHC RBC-ENTMCNC: 31.7 PG — SIGNIFICANT CHANGE UP (ref 27–34)
MCHC RBC-ENTMCNC: 35 GM/DL — SIGNIFICANT CHANGE UP (ref 32–36)
MCV RBC AUTO: 90.5 FL — SIGNIFICANT CHANGE UP (ref 80–100)
PLATELET # BLD AUTO: 101 K/UL — LOW (ref 150–400)
POTASSIUM SERPL-MCNC: 4.8 MMOL/L — SIGNIFICANT CHANGE UP (ref 3.5–5.3)
POTASSIUM SERPL-SCNC: 4.8 MMOL/L — SIGNIFICANT CHANGE UP (ref 3.5–5.3)
PROT SERPL-MCNC: 5.2 G/DL — LOW (ref 6.6–8.7)
RBC # BLD: 3.69 M/UL — LOW (ref 4.2–5.8)
RBC # FLD: 12.2 % — SIGNIFICANT CHANGE UP (ref 10.3–14.5)
SARS-COV-2 IGG+IGM SERPL QL IA: >250 U/ML — HIGH
SARS-COV-2 IGG+IGM SERPL QL IA: POSITIVE
SODIUM SERPL-SCNC: 137 MMOL/L — SIGNIFICANT CHANGE UP (ref 135–145)
TROPONIN T SERPL-MCNC: 0.21 NG/ML — HIGH (ref 0–0.06)
WBC # BLD: 9.78 K/UL — SIGNIFICANT CHANGE UP (ref 3.8–10.5)
WBC # FLD AUTO: 9.78 K/UL — SIGNIFICANT CHANGE UP (ref 3.8–10.5)

## 2021-10-08 PROCEDURE — 99024 POSTOP FOLLOW-UP VISIT: CPT

## 2021-10-08 PROCEDURE — 93010 ELECTROCARDIOGRAM REPORT: CPT

## 2021-10-08 PROCEDURE — 71045 X-RAY EXAM CHEST 1 VIEW: CPT | Mod: 26

## 2021-10-08 RX ORDER — LIDOCAINE 4 G/100G
1 CREAM TOPICAL ONCE
Refills: 0 | Status: COMPLETED | OUTPATIENT
Start: 2021-10-08 | End: 2021-10-08

## 2021-10-08 RX ORDER — INSULIN LISPRO 100/ML
VIAL (ML) SUBCUTANEOUS
Refills: 0 | Status: DISCONTINUED | OUTPATIENT
Start: 2021-10-08 | End: 2021-10-09

## 2021-10-08 RX ORDER — OXYCODONE HYDROCHLORIDE 5 MG/1
5 TABLET ORAL EVERY 4 HOURS
Refills: 0 | Status: DISCONTINUED | OUTPATIENT
Start: 2021-10-08 | End: 2021-10-12

## 2021-10-08 RX ORDER — INSULIN LISPRO 100/ML
VIAL (ML) SUBCUTANEOUS
Refills: 0 | Status: DISCONTINUED | OUTPATIENT
Start: 2021-10-08 | End: 2021-10-08

## 2021-10-08 RX ORDER — METOPROLOL TARTRATE 50 MG
12.5 TABLET ORAL
Refills: 0 | Status: DISCONTINUED | OUTPATIENT
Start: 2021-10-08 | End: 2021-10-08

## 2021-10-08 RX ORDER — ACETAMINOPHEN 500 MG
975 TABLET ORAL EVERY 6 HOURS
Refills: 0 | Status: DISCONTINUED | OUTPATIENT
Start: 2021-10-08 | End: 2021-10-12

## 2021-10-08 RX ORDER — ENOXAPARIN SODIUM 100 MG/ML
40 INJECTION SUBCUTANEOUS DAILY
Refills: 0 | Status: DISCONTINUED | OUTPATIENT
Start: 2021-10-08 | End: 2021-10-12

## 2021-10-08 RX ORDER — LANOLIN ALCOHOL/MO/W.PET/CERES
5 CREAM (GRAM) TOPICAL AT BEDTIME
Refills: 0 | Status: DISCONTINUED | OUTPATIENT
Start: 2021-10-08 | End: 2021-10-12

## 2021-10-08 RX ORDER — DEXTROSE 50 % IN WATER 50 %
12.5 SYRINGE (ML) INTRAVENOUS ONCE
Refills: 0 | Status: DISCONTINUED | OUTPATIENT
Start: 2021-10-08 | End: 2021-10-09

## 2021-10-08 RX ORDER — SODIUM CHLORIDE 9 MG/ML
1000 INJECTION, SOLUTION INTRAVENOUS
Refills: 0 | Status: DISCONTINUED | OUTPATIENT
Start: 2021-10-08 | End: 2021-10-09

## 2021-10-08 RX ORDER — DEXTROSE 50 % IN WATER 50 %
25 SYRINGE (ML) INTRAVENOUS ONCE
Refills: 0 | Status: DISCONTINUED | OUTPATIENT
Start: 2021-10-08 | End: 2021-10-09

## 2021-10-08 RX ORDER — METOPROLOL TARTRATE 50 MG
25 TABLET ORAL
Refills: 0 | Status: DISCONTINUED | OUTPATIENT
Start: 2021-10-08 | End: 2021-10-12

## 2021-10-08 RX ORDER — INSULIN GLARGINE 100 [IU]/ML
35 INJECTION, SOLUTION SUBCUTANEOUS ONCE
Refills: 0 | Status: COMPLETED | OUTPATIENT
Start: 2021-10-08 | End: 2021-10-08

## 2021-10-08 RX ORDER — DEXTROSE 50 % IN WATER 50 %
15 SYRINGE (ML) INTRAVENOUS ONCE
Refills: 0 | Status: DISCONTINUED | OUTPATIENT
Start: 2021-10-08 | End: 2021-10-09

## 2021-10-08 RX ORDER — GLUCAGON INJECTION, SOLUTION 0.5 MG/.1ML
1 INJECTION, SOLUTION SUBCUTANEOUS ONCE
Refills: 0 | Status: DISCONTINUED | OUTPATIENT
Start: 2021-10-08 | End: 2021-10-09

## 2021-10-08 RX ORDER — OXYCODONE HYDROCHLORIDE 5 MG/1
10 TABLET ORAL EVERY 4 HOURS
Refills: 0 | Status: DISCONTINUED | OUTPATIENT
Start: 2021-10-08 | End: 2021-10-12

## 2021-10-08 RX ORDER — MAGNESIUM SULFATE 500 MG/ML
2 VIAL (ML) INJECTION ONCE
Refills: 0 | Status: COMPLETED | OUTPATIENT
Start: 2021-10-08 | End: 2021-10-08

## 2021-10-08 RX ADMIN — Medication 100 MILLIGRAM(S): at 16:19

## 2021-10-08 RX ADMIN — ATORVASTATIN CALCIUM 20 MILLIGRAM(S): 80 TABLET, FILM COATED ORAL at 21:14

## 2021-10-08 RX ADMIN — INSULIN HUMAN 2 UNIT(S)/HR: 100 INJECTION, SOLUTION SUBCUTANEOUS at 04:51

## 2021-10-08 RX ADMIN — Medication 100 MILLIGRAM(S): at 00:30

## 2021-10-08 RX ADMIN — Medication 325 MILLIGRAM(S): at 13:25

## 2021-10-08 RX ADMIN — Medication 12.5 MILLIGRAM(S): at 18:18

## 2021-10-08 RX ADMIN — Medication 5 MILLIGRAM(S): at 02:30

## 2021-10-08 RX ADMIN — Medication 12.5 MILLIGRAM(S): at 06:04

## 2021-10-08 RX ADMIN — SENNA PLUS 2 TABLET(S): 8.6 TABLET ORAL at 21:14

## 2021-10-08 RX ADMIN — Medication 300 MILLIGRAM(S): at 20:17

## 2021-10-08 RX ADMIN — Medication 100 MILLIGRAM(S): at 08:21

## 2021-10-08 RX ADMIN — INSULIN GLARGINE 35 UNIT(S): 100 INJECTION, SOLUTION SUBCUTANEOUS at 10:25

## 2021-10-08 RX ADMIN — ENOXAPARIN SODIUM 40 MILLIGRAM(S): 100 INJECTION SUBCUTANEOUS at 13:26

## 2021-10-08 RX ADMIN — PANTOPRAZOLE SODIUM 40 MILLIGRAM(S): 20 TABLET, DELAYED RELEASE ORAL at 13:25

## 2021-10-08 RX ADMIN — Medication 300 MILLIGRAM(S): at 08:21

## 2021-10-08 RX ADMIN — LIDOCAINE 1 PATCH: 4 CREAM TOPICAL at 01:15

## 2021-10-08 RX ADMIN — Medication 50 GRAM(S): at 04:50

## 2021-10-08 RX ADMIN — LIDOCAINE 1 PATCH: 4 CREAM TOPICAL at 19:30

## 2021-10-08 RX ADMIN — CHLORHEXIDINE GLUCONATE 1 APPLICATION(S): 213 SOLUTION TOPICAL at 13:26

## 2021-10-08 NOTE — PHYSICAL THERAPY INITIAL EVALUATION ADULT - DIAGNOSIS, PT EVAL
pt demonstrates decreased functional mobility due to decreased endurance and balance secondary to surgical procedure. .

## 2021-10-08 NOTE — PHYSICAL THERAPY INITIAL EVALUATION ADULT - ACTIVE RANGE OF MOTION EXAMINATION, REHAB EVAL
maintaining sternal precautions/bilateral upper extremity Active ROM was WFL (within functional limits)/bilateral  lower extremity Active ROM was WFL (within functional limits)

## 2021-10-08 NOTE — PHYSICAL THERAPY INITIAL EVALUATION ADULT - ADDITIONAL COMMENTS
pt reports he lives in a house with 3 steps to enter with 2 handrails, pt states all needs are on ground level. pt reports independence with all ADLs, no assistive device needed, working and driving prior to admission. pt has a cane at home, no DME at home.

## 2021-10-08 NOTE — PHYSICAL THERAPY INITIAL EVALUATION ADULT - GENERAL OBSERVATIONS, REHAB EVAL
pt received OOB in chair + chest tube to suction + herrera + VCBs + tele/ + pacemaker box + IJ triple lumen, A&OX4, NAD & willing to participate with PT.

## 2021-10-08 NOTE — PHYSICAL THERAPY INITIAL EVALUATION ADULT - PERTINENT HX OF CURRENT PROBLEM, REHAB EVAL
69y/o M with PMHx Severe AS/AI, AAA, PFO, HTN, prostate cancer, testicular ca, right lung adenocarcinoma s/p chemo and xRt now s/p AVR with Dr Peck on 10/7/21.

## 2021-10-09 LAB
ANION GAP SERPL CALC-SCNC: 9 MMOL/L — SIGNIFICANT CHANGE UP (ref 5–17)
BUN SERPL-MCNC: 19.1 MG/DL — SIGNIFICANT CHANGE UP (ref 8–20)
CALCIUM SERPL-MCNC: 8 MG/DL — LOW (ref 8.6–10.2)
CHLORIDE SERPL-SCNC: 103 MMOL/L — SIGNIFICANT CHANGE UP (ref 98–107)
CO2 SERPL-SCNC: 23 MMOL/L — SIGNIFICANT CHANGE UP (ref 22–29)
CREAT SERPL-MCNC: 0.72 MG/DL — SIGNIFICANT CHANGE UP (ref 0.5–1.3)
GLUCOSE BLDC GLUCOMTR-MCNC: 115 MG/DL — HIGH (ref 70–99)
GLUCOSE BLDC GLUCOMTR-MCNC: 118 MG/DL — HIGH (ref 70–99)
GLUCOSE SERPL-MCNC: 124 MG/DL — HIGH (ref 70–99)
HCT VFR BLD CALC: 32.7 % — LOW (ref 39–50)
HGB BLD-MCNC: 11.3 G/DL — LOW (ref 13–17)
MAGNESIUM SERPL-MCNC: 2 MG/DL — SIGNIFICANT CHANGE UP (ref 1.6–2.6)
MCHC RBC-ENTMCNC: 31.4 PG — SIGNIFICANT CHANGE UP (ref 27–34)
MCHC RBC-ENTMCNC: 34.6 GM/DL — SIGNIFICANT CHANGE UP (ref 32–36)
MCV RBC AUTO: 90.8 FL — SIGNIFICANT CHANGE UP (ref 80–100)
PLATELET # BLD AUTO: 100 K/UL — LOW (ref 150–400)
POTASSIUM SERPL-MCNC: 4.5 MMOL/L — SIGNIFICANT CHANGE UP (ref 3.5–5.3)
POTASSIUM SERPL-SCNC: 4.5 MMOL/L — SIGNIFICANT CHANGE UP (ref 3.5–5.3)
RBC # BLD: 3.6 M/UL — LOW (ref 4.2–5.8)
RBC # FLD: 12.5 % — SIGNIFICANT CHANGE UP (ref 10.3–14.5)
SODIUM SERPL-SCNC: 135 MMOL/L — SIGNIFICANT CHANGE UP (ref 135–145)
WBC # BLD: 10.96 K/UL — HIGH (ref 3.8–10.5)
WBC # FLD AUTO: 10.96 K/UL — HIGH (ref 3.8–10.5)

## 2021-10-09 PROCEDURE — 71045 X-RAY EXAM CHEST 1 VIEW: CPT | Mod: 26

## 2021-10-09 PROCEDURE — 93010 ELECTROCARDIOGRAM REPORT: CPT

## 2021-10-09 PROCEDURE — 99024 POSTOP FOLLOW-UP VISIT: CPT

## 2021-10-09 RX ORDER — SODIUM CHLORIDE 9 MG/ML
3 INJECTION INTRAMUSCULAR; INTRAVENOUS; SUBCUTANEOUS EVERY 8 HOURS
Refills: 0 | Status: DISCONTINUED | OUTPATIENT
Start: 2021-10-09 | End: 2021-10-12

## 2021-10-09 RX ADMIN — Medication 325 MILLIGRAM(S): at 11:34

## 2021-10-09 RX ADMIN — PANTOPRAZOLE SODIUM 40 MILLIGRAM(S): 20 TABLET, DELAYED RELEASE ORAL at 11:35

## 2021-10-09 RX ADMIN — ATORVASTATIN CALCIUM 20 MILLIGRAM(S): 80 TABLET, FILM COATED ORAL at 21:26

## 2021-10-09 RX ADMIN — SODIUM CHLORIDE 3 MILLILITER(S): 9 INJECTION INTRAMUSCULAR; INTRAVENOUS; SUBCUTANEOUS at 13:06

## 2021-10-09 RX ADMIN — SENNA PLUS 2 TABLET(S): 8.6 TABLET ORAL at 21:25

## 2021-10-09 RX ADMIN — ENOXAPARIN SODIUM 40 MILLIGRAM(S): 100 INJECTION SUBCUTANEOUS at 11:34

## 2021-10-09 RX ADMIN — Medication 5 MILLIGRAM(S): at 21:25

## 2021-10-09 RX ADMIN — Medication 100 MILLIGRAM(S): at 00:25

## 2021-10-09 RX ADMIN — SODIUM CHLORIDE 3 MILLILITER(S): 9 INJECTION INTRAMUSCULAR; INTRAVENOUS; SUBCUTANEOUS at 21:10

## 2021-10-09 RX ADMIN — Medication 25 MILLIGRAM(S): at 18:26

## 2021-10-09 RX ADMIN — Medication 25 MILLIGRAM(S): at 05:58

## 2021-10-09 RX ADMIN — Medication 100 MILLIGRAM(S): at 07:46

## 2021-10-09 RX ADMIN — Medication 300 MILLIGRAM(S): at 08:34

## 2021-10-09 NOTE — CHART NOTE - NSCHARTNOTEFT_GEN_A_CORE
71y/o M with PMHx Severe AS/AI, AAA, PFO, HTN, prostate cancer, testicular ca, right lung adenocarcinoma s/p chemo and xRt now s/p AVR with Dr Peck on 10/7/21.  Pt POD#2 VSS.    Patient seen and examined. Notes, flowsheets, medications, radiologic images and labs reviewed.  pt hemodynamically stable and transferred to [address] [place] [address]

## 2021-10-09 NOTE — DIETITIAN INITIAL EVALUATION ADULT. - PERTINENT LABORATORY DATA
10-09 Na135 mmol/L Glu 124 mg/dL<H> K+ 4.5 mmol/L Cr  0.72 mg/dL BUN 19.1 mg/dL Phos n/a   Alb n/a   PAB n/a     n/a  HgbA1C

## 2021-10-09 NOTE — DIETITIAN INITIAL EVALUATION ADULT. - OTHER INFO
Pt is a 70 year old male with HTN, prostate cancer, testicular ca, right lung adenocarcinoma s/p chemo and xRT, PFO, AAA, and severe aortic stenosis present today for PST.  He had an abnormal EKG with changes compared to previous one a few years ago which prompted further cardiac evaluation. He is status post radiation treatment and chemotherapy for right-sided lung adenocarcinoma.  Recent imaging and work up revealed severe aortic stenosis.  He report c/o dizziness first thing in the morning and sometimes when getting up from a seated position. denies fatigue or chest pain. He is now AVR with Dr Peck on 10/7/21.  Pt is awake and alert; tolerated fluids well post-op without difficulty swallowing. Pt reports eating well PTA, denies any recent weight changes. Therapeutic diet guidelines verbally reviewed. Pt declined further education at this time.

## 2021-10-10 ENCOUNTER — TRANSCRIPTION ENCOUNTER (OUTPATIENT)
Age: 71
End: 2021-10-10

## 2021-10-10 LAB
ANION GAP SERPL CALC-SCNC: 11 MMOL/L — SIGNIFICANT CHANGE UP (ref 5–17)
BUN SERPL-MCNC: 16.4 MG/DL — SIGNIFICANT CHANGE UP (ref 8–20)
CALCIUM SERPL-MCNC: 8.6 MG/DL — SIGNIFICANT CHANGE UP (ref 8.6–10.2)
CHLORIDE SERPL-SCNC: 101 MMOL/L — SIGNIFICANT CHANGE UP (ref 98–107)
CO2 SERPL-SCNC: 26 MMOL/L — SIGNIFICANT CHANGE UP (ref 22–29)
CREAT SERPL-MCNC: 0.72 MG/DL — SIGNIFICANT CHANGE UP (ref 0.5–1.3)
GLUCOSE SERPL-MCNC: 109 MG/DL — HIGH (ref 70–99)
HCT VFR BLD CALC: 33.7 % — LOW (ref 39–50)
HGB BLD-MCNC: 11.5 G/DL — LOW (ref 13–17)
MAGNESIUM SERPL-MCNC: 1.8 MG/DL — SIGNIFICANT CHANGE UP (ref 1.8–2.6)
MCHC RBC-ENTMCNC: 31.5 PG — SIGNIFICANT CHANGE UP (ref 27–34)
MCHC RBC-ENTMCNC: 34.1 GM/DL — SIGNIFICANT CHANGE UP (ref 32–36)
MCV RBC AUTO: 92.3 FL — SIGNIFICANT CHANGE UP (ref 80–100)
PLATELET # BLD AUTO: 108 K/UL — LOW (ref 150–400)
POTASSIUM SERPL-MCNC: 3.9 MMOL/L — SIGNIFICANT CHANGE UP (ref 3.5–5.3)
POTASSIUM SERPL-SCNC: 3.9 MMOL/L — SIGNIFICANT CHANGE UP (ref 3.5–5.3)
RBC # BLD: 3.65 M/UL — LOW (ref 4.2–5.8)
RBC # FLD: 12.2 % — SIGNIFICANT CHANGE UP (ref 10.3–14.5)
SODIUM SERPL-SCNC: 138 MMOL/L — SIGNIFICANT CHANGE UP (ref 135–145)
WBC # BLD: 7.73 K/UL — SIGNIFICANT CHANGE UP (ref 3.8–10.5)
WBC # FLD AUTO: 7.73 K/UL — SIGNIFICANT CHANGE UP (ref 3.8–10.5)

## 2021-10-10 PROCEDURE — 71045 X-RAY EXAM CHEST 1 VIEW: CPT | Mod: 26

## 2021-10-10 RX ORDER — MAGNESIUM SULFATE 500 MG/ML
2 VIAL (ML) INJECTION ONCE
Refills: 0 | Status: COMPLETED | OUTPATIENT
Start: 2021-10-10 | End: 2021-10-10

## 2021-10-10 RX ORDER — POTASSIUM CHLORIDE 20 MEQ
40 PACKET (EA) ORAL ONCE
Refills: 0 | Status: COMPLETED | OUTPATIENT
Start: 2021-10-10 | End: 2021-10-10

## 2021-10-10 RX ADMIN — ENOXAPARIN SODIUM 40 MILLIGRAM(S): 100 INJECTION SUBCUTANEOUS at 21:36

## 2021-10-10 RX ADMIN — PANTOPRAZOLE SODIUM 40 MILLIGRAM(S): 20 TABLET, DELAYED RELEASE ORAL at 08:29

## 2021-10-10 RX ADMIN — ATORVASTATIN CALCIUM 20 MILLIGRAM(S): 80 TABLET, FILM COATED ORAL at 21:36

## 2021-10-10 RX ADMIN — SENNA PLUS 2 TABLET(S): 8.6 TABLET ORAL at 21:36

## 2021-10-10 RX ADMIN — Medication 25 MILLIGRAM(S): at 18:08

## 2021-10-10 RX ADMIN — Medication 325 MILLIGRAM(S): at 08:29

## 2021-10-10 RX ADMIN — Medication 50 GRAM(S): at 11:34

## 2021-10-10 RX ADMIN — Medication 5 MILLIGRAM(S): at 21:36

## 2021-10-10 RX ADMIN — Medication 25 MILLIGRAM(S): at 06:08

## 2021-10-10 RX ADMIN — SODIUM CHLORIDE 3 MILLILITER(S): 9 INJECTION INTRAMUSCULAR; INTRAVENOUS; SUBCUTANEOUS at 14:27

## 2021-10-10 RX ADMIN — SODIUM CHLORIDE 3 MILLILITER(S): 9 INJECTION INTRAMUSCULAR; INTRAVENOUS; SUBCUTANEOUS at 05:04

## 2021-10-10 RX ADMIN — SODIUM CHLORIDE 3 MILLILITER(S): 9 INJECTION INTRAMUSCULAR; INTRAVENOUS; SUBCUTANEOUS at 21:23

## 2021-10-10 RX ADMIN — Medication 40 MILLIEQUIVALENT(S): at 11:33

## 2021-10-10 NOTE — DISCHARGE NOTE PROVIDER - NSDCFUADDINST_GEN_ALL_CORE_FT
Choose lean meats and poultry without skin and prepare them without added saturated/trans fat. Choose fish at least twice a week, especially those high in omega-3 (salmon or trout). Select fat-free or low-fat dairy products. To lower cholesterol, reduce saturated fat to no more than 5 to 6 percent of total calories. For someone eating 2,000 calories a day, that’s about 13 grams of saturated fat.  Cut back on beverages and foods with added sugars.  Choose and prepare foods with little or no salt. To lower blood pressure, reducing daily intake of sodium to 1,500 mg is desirable because it can lower blood pressure.  If you drink alcohol, drink sparingly and NOT if you are taking narcotics for pain. Follow the American Heart Association recommendations when you eat out, and keep an eye on your portion sizes.   Choose lean meats and poultry without skin and prepare them without added saturated/trans fat. Choose fish at least twice a week, especially those high in omega-3 (salmon or trout). Select fat-free or low-fat dairy products. To lower cholesterol, reduce saturated fat to no more than 5 to 6 percent of total calories. For someone eating 2,000 calories a day, that’s about 13 grams of saturated fat.  Cut back on beverages and foods with added sugars.  Choose and prepare foods with little or no salt. To lower blood pressure, reducing daily intake of sodium to 1,500 mg is desirable because it can lower blood pressure.  If you drink alcohol, drink sparingly and NOT if you are taking narcotics for pain. Follow the American Heart Association recommendations when you eat out, and keep an eye on your portion sizes.

## 2021-10-10 NOTE — DISCHARGE NOTE PROVIDER - NSDCCPCAREPLAN_GEN_ALL_CORE_FT
PRINCIPAL DISCHARGE DIAGNOSIS  Diagnosis: Severe aortic stenosis  Assessment and Plan of Treatment: 1. Daily Shower  2. Weight yourself daily and notify any weight gain greater than 2-3 pounds in 24 hours.  3. Regular diet - low fat, low cholesterol, no added salt.  4. Cleanse midsternal incision daily while showering with warm water and mild soap, pat dry and maintain open to air.   DO NOT APPLY ANY LOTION, CREAMS, OR POWDERS TO INCISIONS, KEEP OPEN TO AIR  5. No driving until cleared by MD.   6. No heavy lifting nothing greater than 5 pounds until cleared by MD.   7. Call / Notify MD any fever greater than 101.0  8. Increase Activity as tolerated.  9. Utilize heart pillow to splint pillow  Continue Aspirin for valvular thromboprophylaxis.  Continue Lipitor for hyperlipidemia/anti-inflammatory.  Contine Lopressor for beta-blockade; HR control, arrhythmia prophylaxis and BP management.  Continue for pain management.  Constipation is a common issue postoperatively. In addition to narcotic medications, you may also be on iron supplements or other medications that make constipation worse. Try to avoid straining after cardiac surgery. Please continue to take Docusate Sodium (Colace) as ordered and you may add medications such as metamucil, miralax, senna (Senakot) or other laxatives as needed to relieve constipation.         PRINCIPAL DISCHARGE DIAGNOSIS  Diagnosis: Severe aortic stenosis  Assessment and Plan of Treatment: 1. Daily Shower  2. Weight yourself daily and notify any weight gain greater than 2-3 pounds in 24 hours.  3. Regular diet - low fat, low cholesterol, no added salt.  4. Cleanse midsternal incision daily while showering with warm water and mild soap, pat dry and maintain open to air.   DO NOT APPLY ANY LOTION, CREAMS, OR POWDERS TO INCISIONS, KEEP OPEN TO AIR  5. No driving until cleared by MD.   6. No heavy lifting nothing greater than 5 pounds until cleared by MD.   7. Call / Notify MD any fever greater than 101.0  8. Increase Activity as tolerated.  9. Utilize heart pillow to splint pillow.  10. You will receive a wallet card about your new valve in the mail.  Please carry it with you to present to anyone who may ask if you have any medical implants. Be sure to inform your doctors including your dentist about your valve since you will need to take antibiotics to reduce the risk of infection before certain medical and dental procedures.  Continue Aspirin for valvular thromboprophylaxis.  Continue Lipitor for hyperlipidemia/anti-inflammatory.  Contine Lopressor for beta-blockade; HR control, arrhythmia prophylaxis and BP management.  Continue for pain management.  Constipation is a common issue postoperatively. In addition to narcotic medications, you may also be on iron supplements or other medications that make constipation worse. Try to avoid straining after cardiac surgery. Please continue to take Docusate Sodium (Colace) as ordered and you may add medications such as metamucil, miralax, senna (Senakot) or other laxatives as needed to relieve constipation.       PRINCIPAL DISCHARGE DIAGNOSIS  Diagnosis: Severe aortic stenosis  Assessment and Plan of Treatment: 1. Take ALL of your medications as ordered. Fill your prescriptions the day you are discharged and take according to the schedule you were given. Continue to take a stool softener if you are taking narcotic pain medications. If you have any questions or are unable to fill the prescriptions, please call the office right away at [phone].  2. Shower daily. Clean all incisions daily while showering with warm water and mild soap, pat dry with a clean towel and do not cover with any dressings unless instructed to. No bathing, swimming in a pool or the ocean until instructed by MD.  DO NOT use creams or lotions on the wound.  3. We advise that you do not drive until instructed by MD. Sit in the rear passenger seat with the red pillow between chest and seatbelt to avoid injury in the event of a motor vehicle accident until you see the surgeon again in the office.  4. You may not return to work until instructed by MD.   5. Please eat a low fat, low cholesterol, low salt diet. (No added/extra salt). A nutritional supplement like Ensure or Glucerna (for diabetics) may help you reach your nutritional goals after surgery and aid in your recovery.  6. Weigh yourself every day in the morning and record it in the weight log in your red folder. Notify the office of any weight gain more than 2-3 pounds in 24 hours.  **Please LIMIT your fluid intake to less than a liter a day.**  7. Continue breathing exercises several times a day. Continue to use your heart pillow when coughing.  8. No heavy lifting nothing greater than 5 pounds until cleared by MD. We recommend that you sleep on your back or your side but not your stomach for the next 4-6 weeks.  9. Call / Notify MD any fever greater than 101.0, any drainage from incisions or if they become red, hot or very tender to the touch.  10. Increase activity as tolerated. Walk indoors and/or outdoors at least 3 times a day.  11. Take a stool softener daily to reduce straining.      SECONDARY DISCHARGE DIAGNOSES  Diagnosis: Aortic stenosis  Assessment and Plan of Treatment: - You will receive a wallet card about your new valve in the mail.  Please carry it with you to present to anyone who may ask if you have any medical implants.  - Be sure to inform your doctors including your dentist about your valve since you will need to take antibiotics to reduce the risk of infection before certain medical and dental procedures.    Diagnosis: HTN (hypertension)  Assessment and Plan of Treatment: You have hypertension, or high blood pressure. It is very important to continue your medication as ordered. High blood pressure increases your risk for heart attack, stroke and kidney disease. It does not usually cause symptoms but it can be serious.   1. Be sure to take all of your medication as prescribed.  2. You may find it helpful to get a home blood pressure meter and check your blood pressure periodically.  3. Lifestyle changes can improve your blood pressure and lessen the amount of medication you need, such as: losing weight, choosing a diet low in fat and rich in fruits, vegetables and low-fat dairy products, reducing the amount of salt you eat, cut down on alcohol (to less than two drinks per day) and do something active most days for 30 minutes or more.    Diagnosis: HLD (hyperlipidemia)  Assessment and Plan of Treatment: Take all medications as prescribed. Follow up with your primary care doctor within two weeks.

## 2021-10-10 NOTE — DISCHARGE NOTE PROVIDER - NSDCHHNEEDSERVICE_GEN_ALL_CORE
Teaching and training Medication teaching and assessment/Observation and assessment/Teaching and training/Wound care and assessment

## 2021-10-10 NOTE — DISCHARGE NOTE PROVIDER - NSDCMRMEDTOKEN_GEN_ALL_CORE_FT
ASPIRIN EC 81 MG TABLET: TAKE 1 TABLET BY MOUTH EVERY DAY  ATORVASTATIN 20 MG TABLET: TAKE 1 TABLET BY MOUTH EVERYDAY AT BEDTIME  metoprolol succinate 25 mg oral tablet, extended release: 1 tab(s) orally once a day   aspirin 325 mg oral delayed release tablet: 1 tab(s) orally once a day  atorvastatin 20 mg oral tablet: 1 tab(s) orally once a day (at bedtime)  metoprolol tartrate 25 mg oral tablet: 1 tab(s) orally 2 times a day  oxyCODONE 5 mg oral tablet: 1-2 tab(s) orally every 6 hours, As Needed -Moderate Pain (4 - 6) to severe pain (7-10) MDD:6  senna oral tablet: 2 tab(s) orally once a day (at bedtime)  Tylenol 325 mg oral tablet: 2 tab(s) orally every 4 hours   aspirin 325 mg oral delayed release tablet: 1 tab(s) orally once a day  atorvastatin 20 mg oral tablet: 1 tab(s) orally once a day (at bedtime)  DME Rolling Walker:   metoprolol tartrate 25 mg oral tablet: 1 tab(s) orally 2 times a day  oxyCODONE 5 mg oral tablet: 1-2 tab(s) orally every 6 hours, As Needed -Moderate Pain (4 - 6) to severe pain (7-10) MDD:6  senna oral tablet: 2 tab(s) orally once a day (at bedtime)  Tylenol 325 mg oral tablet: 2 tab(s) orally every 4 hours

## 2021-10-10 NOTE — DISCHARGE NOTE PROVIDER - CARE PROVIDERS DIRECT ADDRESSES
[email or web address]

## 2021-10-10 NOTE — DISCHARGE NOTE PROVIDER - CARE PROVIDER_API CALL
Omid Peck)  Surgery; Thoracic and Cardiac Surgery  [address] [place] [address]  Phone: [phone]  Fax: [phone]  Follow Up Time: 2 weeks    Ja Myers)  Cardiovascular Disease  [address] [place] [address]  Phone: [phone]  Fax: [phone]  Follow Up Time: 2 weeks   Omid Peck)  Surgery; Thoracic and Cardiac Surgery  [address] [place] [address]  Phone: [phone]  Fax: [phone]  Scheduled Appointment: 10/25/2021 08:45 AM    Ja Myers)  Cardiovascular Disease  [address] [place] [address]  Phone: [phone]  Fax: [phone]  Follow Up Time: 2 weeks

## 2021-10-10 NOTE — DISCHARGE NOTE PROVIDER - NSDCFUADDAPPT_GEN_ALL_CORE_FT
Please follow up with Dr. Peck on    Please arrange a follow up with your cardiologist & PCP in 2 weeks from discharge.    The cardiac surgery office is located at [address], [address], [place], [place].  Please call the CT surgery office upon arrival to your appointment, you will then be instructed when to enter the building. This is being done due to COVID precautions.    Please come to ED or Call Cardio thoracic office at [phone] if Chest pain, Shortness of Breath, persistent Nausea & vomiting, oozing from wounds, 2 lb increase in weight in 24 hours.    Your Care Navigator  Nurse Practitioner will be in touch to see you in your home within a few days from discharge. The follow your Heart program can help ensure you understand your medications, discharge instructions & answer any questions you may have at that time.  They are also a great source to address concerns during the day & may be reached  at  [phone].     Please follow up with Dr. Peck on ______________.     Your Care Navigator Nurse Practitioner will be in touch to see you in your home within a few days from discharge. The Follow Your Heart program can help ensure you understand your medications, discharge instructions and answer any questions you may have at that time. They are also a great source to address concerns during the day and may be reached at [phone].    Please arrange a follow up with your cardiologist & PCP in 2 weeks from discharge.    Please call the Cardiothoracic Surgery office at [phone] if you are experiencing any shortness of breath, chest pain, fevers or chills, drainage from the incisions, persistent nausea, vomiting or if you have any questions about your medications. If the symptoms are severe, call 911 and go to the nearest hospital. You can also call [phone] for an emergency [facility] ambulance, which will take you to the closest [facility].    If you need any assistance for making any appointments for a new consult or referral in any specialty, please call our [facility] Clinical Coordination Center at [phone]. Please follow up with Dr. Peck on MONDAY 10/25 at 8:45AM **AT THE [place] [facility]. Ai SWETA ALANIS**     Your Care Navigator Nurse Practitioner will be in touch to see you in your home within a few days from discharge. The Follow Your Heart program can help ensure you understand your medications, discharge instructions and answer any questions you may have at that time. They are also a great source to address concerns during the day and may be reached at [phone].    Please arrange a follow up with your cardiologist & PCP in 2 weeks from discharge.    Please call the Cardiothoracic Surgery office at [phone] if you are experiencing any shortness of breath, chest pain, fevers or chills, drainage from the incisions, persistent nausea, vomiting or if you have any questions about your medications. If the symptoms are severe, call 911 and go to the nearest hospital. You can also call [phone] for an emergency [facility] ambulance, which will take you to the closest [facility].    If you need any assistance for making any appointments for a new consult or referral in any specialty, please call our [facility] Clinical Coordination Center at [phone].

## 2021-10-10 NOTE — DISCHARGE NOTE PROVIDER - HOSPITAL COURSE
70 year old Male with PMHx Severe AS/AI, AAA, PFO, HTN, prostate cancer, testicular ca, right lung adenocarcinoma s/p chemo and xRt now s/p AVR with Dr Peck on 10/7/21. Postoperative course remains uneventful at this time. 70 year old Male with PMHx Severe AS/AI, AAA, PFO, HTN, prostate cancer, testicular ca, right lung adenocarcinoma s/p chemo and xRt now s/p AVR with Dr Peck on 10/7/21. Postoperative course remains uneventful at this time. Patient remains hemodynamically stable. Plan to discharge home later today as per Dr. Peck.

## 2021-10-10 NOTE — DISCHARGE NOTE PROVIDER - PROVIDER TOKENS
PROVIDER:[TOKEN:[[id number]],FOLLOWUP:[2 weeks]],PROVIDER:[TOKEN:[[id number]],FOLLOWUP:[2 weeks]] PROVIDER:[TOKEN:[[id number]],SCHEDULEDAPPT:[10/25/2021],SCHEDULEDAPPTTIME:[08:45 AM]],PROVIDER:[TOKEN:[[id number]],FOLLOWUP:[2 weeks]]

## 2021-10-11 LAB
ALBUMIN SERPL ELPH-MCNC: 3.4 G/DL — SIGNIFICANT CHANGE UP (ref 3.3–5.2)
ALP SERPL-CCNC: 63 U/L — SIGNIFICANT CHANGE UP (ref 40–120)
ALT FLD-CCNC: 20 U/L — SIGNIFICANT CHANGE UP
ANION GAP SERPL CALC-SCNC: 9 MMOL/L — SIGNIFICANT CHANGE UP (ref 5–17)
AST SERPL-CCNC: 16 U/L — SIGNIFICANT CHANGE UP
BILIRUB SERPL-MCNC: 0.8 MG/DL — SIGNIFICANT CHANGE UP (ref 0.4–2)
BUN SERPL-MCNC: 14.6 MG/DL — SIGNIFICANT CHANGE UP (ref 8–20)
CALCIUM SERPL-MCNC: 8.2 MG/DL — LOW (ref 8.6–10.2)
CHLORIDE SERPL-SCNC: 103 MMOL/L — SIGNIFICANT CHANGE UP (ref 98–107)
CO2 SERPL-SCNC: 27 MMOL/L — SIGNIFICANT CHANGE UP (ref 22–29)
CREAT SERPL-MCNC: 0.75 MG/DL — SIGNIFICANT CHANGE UP (ref 0.5–1.3)
GLUCOSE SERPL-MCNC: 104 MG/DL — HIGH (ref 70–99)
HCT VFR BLD CALC: 33.2 % — LOW (ref 39–50)
HGB BLD-MCNC: 11.3 G/DL — LOW (ref 13–17)
MAGNESIUM SERPL-MCNC: 1.9 MG/DL — SIGNIFICANT CHANGE UP (ref 1.6–2.6)
MCHC RBC-ENTMCNC: 31 PG — SIGNIFICANT CHANGE UP (ref 27–34)
MCHC RBC-ENTMCNC: 34 GM/DL — SIGNIFICANT CHANGE UP (ref 32–36)
MCV RBC AUTO: 91 FL — SIGNIFICANT CHANGE UP (ref 80–100)
PHOSPHATE SERPL-MCNC: 3.5 MG/DL — SIGNIFICANT CHANGE UP (ref 2.4–4.7)
PLATELET # BLD AUTO: 139 K/UL — LOW (ref 150–400)
POTASSIUM SERPL-MCNC: 4.6 MMOL/L — SIGNIFICANT CHANGE UP (ref 3.5–5.3)
POTASSIUM SERPL-SCNC: 4.6 MMOL/L — SIGNIFICANT CHANGE UP (ref 3.5–5.3)
PROT SERPL-MCNC: 5.3 G/DL — LOW (ref 6.6–8.7)
RBC # BLD: 3.65 M/UL — LOW (ref 4.2–5.8)
RBC # FLD: 12.1 % — SIGNIFICANT CHANGE UP (ref 10.3–14.5)
SODIUM SERPL-SCNC: 139 MMOL/L — SIGNIFICANT CHANGE UP (ref 135–145)
WBC # BLD: 6.22 K/UL — SIGNIFICANT CHANGE UP (ref 3.8–10.5)
WBC # FLD AUTO: 6.22 K/UL — SIGNIFICANT CHANGE UP (ref 3.8–10.5)

## 2021-10-11 PROCEDURE — 71045 X-RAY EXAM CHEST 1 VIEW: CPT | Mod: 26

## 2021-10-11 PROCEDURE — 99024 POSTOP FOLLOW-UP VISIT: CPT

## 2021-10-11 RX ORDER — MAGNESIUM SULFATE 500 MG/ML
2 VIAL (ML) INJECTION ONCE
Refills: 0 | Status: COMPLETED | OUTPATIENT
Start: 2021-10-11 | End: 2021-10-11

## 2021-10-11 RX ADMIN — Medication 25 MILLIGRAM(S): at 18:01

## 2021-10-11 RX ADMIN — Medication 325 MILLIGRAM(S): at 09:22

## 2021-10-11 RX ADMIN — SODIUM CHLORIDE 3 MILLILITER(S): 9 INJECTION INTRAMUSCULAR; INTRAVENOUS; SUBCUTANEOUS at 10:49

## 2021-10-11 RX ADMIN — SENNA PLUS 2 TABLET(S): 8.6 TABLET ORAL at 21:37

## 2021-10-11 RX ADMIN — PANTOPRAZOLE SODIUM 40 MILLIGRAM(S): 20 TABLET, DELAYED RELEASE ORAL at 09:22

## 2021-10-11 RX ADMIN — Medication 25 MILLIGRAM(S): at 06:17

## 2021-10-11 RX ADMIN — Medication 50 GRAM(S): at 09:22

## 2021-10-11 RX ADMIN — SODIUM CHLORIDE 3 MILLILITER(S): 9 INJECTION INTRAMUSCULAR; INTRAVENOUS; SUBCUTANEOUS at 21:39

## 2021-10-11 RX ADMIN — ATORVASTATIN CALCIUM 20 MILLIGRAM(S): 80 TABLET, FILM COATED ORAL at 21:38

## 2021-10-11 RX ADMIN — ENOXAPARIN SODIUM 40 MILLIGRAM(S): 100 INJECTION SUBCUTANEOUS at 21:38

## 2021-10-11 RX ADMIN — SODIUM CHLORIDE 3 MILLILITER(S): 9 INJECTION INTRAMUSCULAR; INTRAVENOUS; SUBCUTANEOUS at 05:13

## 2021-10-11 RX ADMIN — Medication 5 MILLIGRAM(S): at 21:37

## 2021-10-12 ENCOUNTER — TRANSCRIPTION ENCOUNTER (OUTPATIENT)
Age: 71
End: 2021-10-12

## 2021-10-12 VITALS
RESPIRATION RATE: 18 BRPM | OXYGEN SATURATION: 97 % | DIASTOLIC BLOOD PRESSURE: 68 MMHG | HEART RATE: 88 BPM | SYSTOLIC BLOOD PRESSURE: 108 MMHG | TEMPERATURE: 98 F

## 2021-10-12 PROBLEM — Z86.79 PERSONAL HISTORY OF OTHER DISEASES OF THE CIRCULATORY SYSTEM: Chronic | Status: ACTIVE | Noted: 2021-09-27

## 2021-10-12 PROBLEM — Z77.098 CONTACT WITH AND (SUSPECTED) EXPOSURE TO OTHER HAZARDOUS, CHIEFLY NONMEDICINAL, CHEMICALS: Chronic | Status: ACTIVE | Noted: 2021-09-27

## 2021-10-12 PROBLEM — C61 MALIGNANT NEOPLASM OF PROSTATE: Chronic | Status: ACTIVE | Noted: 2021-09-27

## 2021-10-12 PROBLEM — Z92.3 PERSONAL HISTORY OF IRRADIATION: Chronic | Status: ACTIVE | Noted: 2021-09-27

## 2021-10-12 PROBLEM — C62.90 MALIGNANT NEOPLASM OF UNSPECIFIED TESTIS, UNSPECIFIED WHETHER DESCENDED OR UNDESCENDED: Chronic | Status: ACTIVE | Noted: 2021-09-27

## 2021-10-12 PROBLEM — R91.1 SOLITARY PULMONARY NODULE: Chronic | Status: ACTIVE | Noted: 2021-09-27

## 2021-10-12 PROBLEM — I10 ESSENTIAL (PRIMARY) HYPERTENSION: Chronic | Status: ACTIVE | Noted: 2021-09-27

## 2021-10-12 PROBLEM — E78.5 HYPERLIPIDEMIA, UNSPECIFIED: Chronic | Status: ACTIVE | Noted: 2021-09-27

## 2021-10-12 LAB
ANION GAP SERPL CALC-SCNC: 10 MMOL/L — SIGNIFICANT CHANGE UP (ref 5–17)
BUN SERPL-MCNC: 15.4 MG/DL — SIGNIFICANT CHANGE UP (ref 8–20)
CALCIUM SERPL-MCNC: 8.5 MG/DL — LOW (ref 8.6–10.2)
CHLORIDE SERPL-SCNC: 102 MMOL/L — SIGNIFICANT CHANGE UP (ref 98–107)
CO2 SERPL-SCNC: 26 MMOL/L — SIGNIFICANT CHANGE UP (ref 22–29)
CREAT SERPL-MCNC: 0.75 MG/DL — SIGNIFICANT CHANGE UP (ref 0.5–1.3)
GLUCOSE SERPL-MCNC: 104 MG/DL — HIGH (ref 70–99)
HCT VFR BLD CALC: 34.2 % — LOW (ref 39–50)
HGB BLD-MCNC: 11.9 G/DL — LOW (ref 13–17)
MAGNESIUM SERPL-MCNC: 1.8 MG/DL — SIGNIFICANT CHANGE UP (ref 1.6–2.6)
MCHC RBC-ENTMCNC: 31.2 PG — SIGNIFICANT CHANGE UP (ref 27–34)
MCHC RBC-ENTMCNC: 34.8 GM/DL — SIGNIFICANT CHANGE UP (ref 32–36)
MCV RBC AUTO: 89.8 FL — SIGNIFICANT CHANGE UP (ref 80–100)
PLATELET # BLD AUTO: 190 K/UL — SIGNIFICANT CHANGE UP (ref 150–400)
POTASSIUM SERPL-MCNC: 4.6 MMOL/L — SIGNIFICANT CHANGE UP (ref 3.5–5.3)
POTASSIUM SERPL-SCNC: 4.6 MMOL/L — SIGNIFICANT CHANGE UP (ref 3.5–5.3)
RBC # BLD: 3.81 M/UL — LOW (ref 4.2–5.8)
RBC # FLD: 12.1 % — SIGNIFICANT CHANGE UP (ref 10.3–14.5)
SODIUM SERPL-SCNC: 138 MMOL/L — SIGNIFICANT CHANGE UP (ref 135–145)
WBC # BLD: 6.41 K/UL — SIGNIFICANT CHANGE UP (ref 3.8–10.5)
WBC # FLD AUTO: 6.41 K/UL — SIGNIFICANT CHANGE UP (ref 3.8–10.5)

## 2021-10-12 PROCEDURE — C1889: CPT

## 2021-10-12 PROCEDURE — 97116 GAIT TRAINING THERAPY: CPT

## 2021-10-12 PROCEDURE — 84100 ASSAY OF PHOSPHORUS: CPT

## 2021-10-12 PROCEDURE — 82330 ASSAY OF CALCIUM: CPT

## 2021-10-12 PROCEDURE — 71045 X-RAY EXAM CHEST 1 VIEW: CPT

## 2021-10-12 PROCEDURE — 82435 ASSAY OF BLOOD CHLORIDE: CPT

## 2021-10-12 PROCEDURE — 83735 ASSAY OF MAGNESIUM: CPT

## 2021-10-12 PROCEDURE — 80048 BASIC METABOLIC PNL TOTAL CA: CPT

## 2021-10-12 PROCEDURE — 86769 SARS-COV-2 COVID-19 ANTIBODY: CPT

## 2021-10-12 PROCEDURE — 83605 ASSAY OF LACTIC ACID: CPT

## 2021-10-12 PROCEDURE — C1751: CPT

## 2021-10-12 PROCEDURE — 84132 ASSAY OF SERUM POTASSIUM: CPT

## 2021-10-12 PROCEDURE — 82553 CREATINE MB FRACTION: CPT

## 2021-10-12 PROCEDURE — 88311 DECALCIFY TISSUE: CPT

## 2021-10-12 PROCEDURE — 88305 TISSUE EXAM BY PATHOLOGIST: CPT

## 2021-10-12 PROCEDURE — 84484 ASSAY OF TROPONIN QUANT: CPT

## 2021-10-12 PROCEDURE — P9045: CPT

## 2021-10-12 PROCEDURE — 84295 ASSAY OF SERUM SODIUM: CPT

## 2021-10-12 PROCEDURE — 93005 ELECTROCARDIOGRAM TRACING: CPT

## 2021-10-12 PROCEDURE — 36415 COLL VENOUS BLD VENIPUNCTURE: CPT

## 2021-10-12 PROCEDURE — 82962 GLUCOSE BLOOD TEST: CPT

## 2021-10-12 PROCEDURE — 85027 COMPLETE CBC AUTOMATED: CPT

## 2021-10-12 PROCEDURE — 85730 THROMBOPLASTIN TIME PARTIAL: CPT

## 2021-10-12 PROCEDURE — 80053 COMPREHEN METABOLIC PANEL: CPT

## 2021-10-12 PROCEDURE — 82550 ASSAY OF CK (CPK): CPT

## 2021-10-12 PROCEDURE — 71045 X-RAY EXAM CHEST 1 VIEW: CPT | Mod: 26

## 2021-10-12 PROCEDURE — 97163 PT EVAL HIGH COMPLEX 45 MIN: CPT

## 2021-10-12 PROCEDURE — 82947 ASSAY GLUCOSE BLOOD QUANT: CPT

## 2021-10-12 PROCEDURE — 85014 HEMATOCRIT: CPT

## 2021-10-12 PROCEDURE — C1769: CPT

## 2021-10-12 PROCEDURE — 85018 HEMOGLOBIN: CPT

## 2021-10-12 PROCEDURE — 85610 PROTHROMBIN TIME: CPT

## 2021-10-12 PROCEDURE — 82803 BLOOD GASES ANY COMBINATION: CPT

## 2021-10-12 RX ORDER — ATORVASTATIN CALCIUM 80 MG/1
1 TABLET, FILM COATED ORAL
Qty: 30 | Refills: 0
Start: 2021-10-12 | End: 2021-11-10

## 2021-10-12 RX ORDER — ASPIRIN/CALCIUM CARB/MAGNESIUM 324 MG
1 TABLET ORAL
Qty: 30 | Refills: 1
Start: 2021-10-12 | End: 2021-12-10

## 2021-10-12 RX ORDER — OXYCODONE HYDROCHLORIDE 5 MG/1
1 TABLET ORAL
Qty: 16 | Refills: 0
Start: 2021-10-12 | End: 2021-10-15

## 2021-10-12 RX ORDER — ASPIRIN/CALCIUM CARB/MAGNESIUM 324 MG
0 TABLET ORAL
Qty: 0 | Refills: 1 | DISCHARGE

## 2021-10-12 RX ORDER — ATORVASTATIN CALCIUM 80 MG/1
0 TABLET, FILM COATED ORAL
Qty: 0 | Refills: 2 | DISCHARGE

## 2021-10-12 RX ORDER — SENNA PLUS 8.6 MG/1
2 TABLET ORAL
Qty: 60 | Refills: 0
Start: 2021-10-12 | End: 2021-11-10

## 2021-10-12 RX ORDER — METOPROLOL TARTRATE 50 MG
1 TABLET ORAL
Qty: 0 | Refills: 0 | DISCHARGE

## 2021-10-12 RX ORDER — ATORVASTATIN CALCIUM 80 MG/1
1 TABLET, FILM COATED ORAL
Qty: 30 | Refills: 1
Start: 2021-10-12 | End: 2021-12-10

## 2021-10-12 RX ORDER — METOPROLOL TARTRATE 50 MG
1 TABLET ORAL
Qty: 60 | Refills: 1
Start: 2021-10-12 | End: 2021-12-10

## 2021-10-12 RX ORDER — MAGNESIUM SULFATE 500 MG/ML
2 VIAL (ML) INJECTION ONCE
Refills: 0 | Status: COMPLETED | OUTPATIENT
Start: 2021-10-12 | End: 2021-10-12

## 2021-10-12 RX ORDER — ACETAMINOPHEN 500 MG
2 TABLET ORAL
Qty: 0 | Refills: 0 | DISCHARGE
Start: 2021-10-12

## 2021-10-12 RX ADMIN — PANTOPRAZOLE SODIUM 40 MILLIGRAM(S): 20 TABLET, DELAYED RELEASE ORAL at 10:25

## 2021-10-12 RX ADMIN — Medication 50 GRAM(S): at 10:22

## 2021-10-12 RX ADMIN — SODIUM CHLORIDE 3 MILLILITER(S): 9 INJECTION INTRAMUSCULAR; INTRAVENOUS; SUBCUTANEOUS at 05:34

## 2021-10-12 RX ADMIN — Medication 25 MILLIGRAM(S): at 05:34

## 2021-10-12 RX ADMIN — Medication 325 MILLIGRAM(S): at 10:25

## 2021-10-12 NOTE — PROGRESS NOTE ADULT - PROBLEM SELECTOR PLAN 5
Maintain tight BP control.   Continue lopressor as tolerated by HR and BP.
Maintain tight BP control.   Continue lopressor as tolerated by HR and BP.
Maintain tight BP mgmt  Cont lopressor
Maintain tight BP control.   Continue lopressor as tolerated by HR and BP.
Maintain tight BP mgmt  Cont lopressor in AM

## 2021-10-12 NOTE — PROGRESS NOTE ADULT - PROBLEM SELECTOR PROBLEM 5
History of abdominal aortic aneurysm (AAA)

## 2021-10-12 NOTE — DISCHARGE NOTE NURSING/CASE MANAGEMENT/SOCIAL WORK - NSDCFUADDAPPT_GEN_ALL_CORE_FT
Please follow up with Dr. Peck on MONDAY 10/25 at 8:45AM **AT THE [place] [facility]. Ai SWETA ALANIS**     Your Care Navigator Nurse Practitioner will be in touch to see you in your home within a few days from discharge. The Follow Your Heart program can help ensure you understand your medications, discharge instructions and answer any questions you may have at that time. They are also a great source to address concerns during the day and may be reached at [phone].    Please arrange a follow up with your cardiologist & PCP in 2 weeks from discharge.    Please call the Cardiothoracic Surgery office at [phone] if you are experiencing any shortness of breath, chest pain, fevers or chills, drainage from the incisions, persistent nausea, vomiting or if you have any questions about your medications. If the symptoms are severe, call 911 and go to the nearest hospital. You can also call [phone] for an emergency [facility] ambulance, which will take you to the closest [facility].    If you need any assistance for making any appointments for a new consult or referral in any specialty, please call our [facility] Clinical Coordination Center at [phone].

## 2021-10-12 NOTE — PROGRESS NOTE ADULT - SUBJECTIVE AND OBJECTIVE BOX
No evidence of anesthesia-related complications.
Significant recent/past 24 hr events:  No acute overnight events. Pt remained HD stable and without acute complaints.  Pt currently in NAD and denies N/V/D HA, dizziness, blurry vision, numbness/tingling, SOB, cough, chest pain, palpitations, abd pain or urinary sx's.   De-intensify in AM-Floor downgrade.    Subjective:  Review of Systems  ROS negative x 10 systems except as noted above    Patient is a 70y old  Male who presents with a chief complaint of Elective AVR (08 Oct 2021 03:06)    HPI:  70 year old male with HTN, prostate cancer, testicular ca, right lung adenocarcinoma s/p chemo and xRT, PFO, AAA, and severe aortic stenosis present today for PST.  He had an abnormal EKG with changes compared to previous one a few years ago which prompted further cardiac evaluation. He is status post radiation treatment and chemotherapy for right-sided lung adenocarcinoma.  Recent imaging and work up revealed severe aortic stenosis.  He report c/o dizziness first thing in the morning and sometimes when getting up from a seated position. denies fatigue or chest pain. He is now schedule for Aortic valve replacement on 10/7/2021 with Dr. Peck      Cardiac Catheterization from 6/28/21 at [facility]  - Normal Coronary Arteries    Transthoracic Echocardiogram from 7/20/21 at [facility]   - LVEF 45%  - There is moderate LVH  - Moderately enlarged left atrium  - Dilation of the ascending aorta and root.   - Mild thickening and calcification of the anterior and posterior mitral valve leaflets, no evidence of mitral valve regurgitation.  - Peak transaortic gradient equals 28.2 mmHg, MAVG 14.4 mmHg, the calculated aortic valve area equals 0.95 cm2, by the continuity equation consistent with severe aortic stenosis. The dimensionless Index value is 0.24.   Echocardiogram in April 2021 showed significant progression of aortic stenosis to severe. DVI 0.2. Aortic valve area 0.6 cm². CT arctic valve calcium score more than 2500.     Abdominal aortic ultrasound also showed mild dilation of proximal aorta to 3.2 cm. This is unchanged from August 2019. He is asymptomatic for dilated abdominal aorta  - EKG 08/07/19: Sinus rhythm. T. inversion in inferior leads and V6.  - Echocardiogram September 2016: Normal LV function. Mild aortic stenosis. Aortic root 4.7 cm. Ascending aorta 4.4 cm   - abdominal ultrasound September 2016: Proximal aorta 3.0 cm.      (27 Sep 2021 14:00)    PAST MEDICAL & SURGICAL HISTORY:  Lesion of right lung  right lung adenocarcinoma    Exposure to Agent Orange    History of radiation therapy    Prostate cancer    Testicular cancer    HTN (hypertension)    HLD (hyperlipidemia)    H/O aortic valve insufficiency    History of abdominal aortic aneurysm (AAA)    History of chemotherapy    History of prostate surgery    H/O removal of testicle  left    Encounter for management of wound VAC  left ankle wound debridment      FAMILY HISTORY:  Family history of lung cancer (Father)    FH: pancreatic cancer (Father, Mother)    FHx: ovarian cancer (Sibling)      Vitals   ICU Vital Signs Last 24 Hrs  T(C): 37.3 (08 Oct 2021 20:00), Max: 37.3 (08 Oct 2021 20:00)  T(F): 99.2 (08 Oct 2021 20:00), Max: 99.2 (08 Oct 2021 20:00)  HR: 78 (09 Oct 2021 00:00) (67 - 101)  BP: --  BP(mean): --  ABP: 146/64 (09 Oct 2021 00:00) (81/61 - 147/64)  ABP(mean): 93 (09 Oct 2021 00:00) (73 - 109)  RR: 17 (09 Oct 2021 00:00) (13 - 35)  SpO2: 95% (09 Oct 2021 00:00) (92% - 97%)      ABG - ( 07 Oct 2021 11:59 )  pH, Arterial: 7.470 pH, Blood: x     /  pCO2: 30    /  pO2: 207   / HCO3: 22    / Base Excess: -1.9  /  SaO2: 99.8        I&O's Detail    07 Oct 2021 07:01  -  08 Oct 2021 07:00  --------------------------------------------------------  IN:    Albumin 5%  - 250 mL: 250 mL    Insulin: 39 mL    IV PiggyBack: 100 mL    IV PiggyBack: 100 mL    IV PiggyBack: 100 mL    IV PiggyBack: 100 mL    IV PiggyBack: 500 mL    Lactated Ringers Bolus: 1000 mL    Lactated Ringers Bolus: 1000 mL    Norepinephrine: 28.8 mL    Oral Fluid: 290 mL    Propofol: 52.5 mL    sodium chloride 0.9%: 105 mL    sodium chloride 0.9%: 210 mL    Vasopressin: 14.1 mL  Total IN: 3889.4 mL    OUT:    Chest Tube (mL): 970 mL    Intermittent Catheterization - Urethral (mL): 2280 mL  Total OUT: 3250 mL    Total NET: 639.4 mL      08 Oct 2021 07:01  -  09 Oct 2021 01:19  --------------------------------------------------------  IN:    Insulin: 1.5 mL    IV PiggyBack: 100 mL    IV PiggyBack: 1000 mL    Oral Fluid: 480 mL    sodium chloride 0.9%: 20 mL    sodium chloride 0.9%: 40 mL  Total IN: 1641.5 mL    OUT:    Chest Tube (mL): 40 mL    Intermittent Catheterization - Urethral (mL): 1320 mL  Total OUT: 1360 mL    Total NET: 281.5 mL    LABS                        11.7   9.78  )-----------( 101      ( 08 Oct 2021 02:24 )             33.4     10-08    137  |  105  |  12.6  ----------------------------<  129<H>  4.8   |  22.0  |  0.76    Ca    8.2<L>      08 Oct 2021 02:24  Mg     1.8     10-08    TPro  5.2<L>  /  Alb  3.5  /  TBili  0.6  /  DBili  x   /  AST  26  /  ALT  13  /  AlkPhos  56  10-08    LIVER FUNCTIONS - ( 08 Oct 2021 02:24 )  Alb: 3.5 g/dL / Pro: 5.2 g/dL / ALK PHOS: 56 U/L / ALT: 13 U/L / AST: 26 U/L / GGT: x           PT/INR - ( 07 Oct 2021 12:12 )   PT: 14.6 sec;   INR: 1.27 ratio         PTT - ( 07 Oct 2021 12:12 )  PTT:53.5 sec  CARDIAC MARKERS ( 08 Oct 2021 02:24 )   U/L / CKMB11.3 ng/mL / Troponin T0.21 ng/mL /        POCT Blood Glucose.: 137 mg/dL (10-08-21 @ 22:01)  POCT Blood Glucose.: 132 mg/dL (10-08-21 @ 17:24)  POCT Blood Glucose.: 143 mg/dL (10-08-21 @ 12:14)  POCT Blood Glucose.: 129 mg/dL (10-08-21 @ 10:23)  POCT Blood Glucose.: 128 mg/dL (10-08-21 @ 07:59)  POCT Blood Glucose.: 135 mg/dL (10-08-21 @ 06:01)  POCT Blood Glucose.: 130 mg/dL (10-08-21 @ 03:51)  POCT Blood Glucose.: 127 mg/dL (10-08-21 @ 02:00)      MEDICATIONS  (STANDING):  aspirin enteric coated 325 milliGRAM(s) Oral daily  atorvastatin 20 milliGRAM(s) Oral at bedtime  cefuroxime  IVPB 1500 milliGRAM(s) IV Intermittent every 8 hours  chlorhexidine 2% Cloths 1 Application(s) Topical daily  dextrose 40% Gel 15 Gram(s) Oral once  dextrose 5%. 1000 milliLiter(s) (50 mL/Hr) IV Continuous <Continuous>  dextrose 5%. 1000 milliLiter(s) (100 mL/Hr) IV Continuous <Continuous>  dextrose 50% Injectable 25 Gram(s) IV Push once  dextrose 50% Injectable 12.5 Gram(s) IV Push once  dextrose 50% Injectable 25 Gram(s) IV Push once  enoxaparin Injectable 40 milliGRAM(s) SubCutaneous daily  glucagon  Injectable 1 milliGRAM(s) IntraMuscular once  insulin lispro (ADMELOG) corrective regimen sliding scale   SubCutaneous Before meals and at bedtime  melatonin 5 milliGRAM(s) Oral at bedtime  metoprolol tartrate 25 milliGRAM(s) Oral two times a day  pantoprazole    Tablet 40 milliGRAM(s) Oral daily  senna 2 Tablet(s) Oral at bedtime  vancomycin  IVPB 1500 milliGRAM(s) IV Intermittent every 12 hours    MEDICATIONS  (PRN):  acetaminophen   Tablet .. 975 milliGRAM(s) Oral every 6 hours PRN Mild Pain (1 - 3)  ondansetron Injectable 4 milliGRAM(s) IV Push every 4 hours PRN Nausea and/or Vomiting  oxyCODONE    IR 5 milliGRAM(s) Oral every 4 hours PRN Moderate Pain (4 - 6)  oxyCODONE    IR 10 milliGRAM(s) Oral every 4 hours PRN Severe Pain (7 - 10)  polyethylene glycol 3350 17 Gram(s) Oral daily PRN Constipation      Allergies:  No Known Allergies    Physical Exam:   Constitutional: NAD  Neck: supple, trachea midline.  No JVD  Respiratory: Breath Sounds equal & clear bilaterally to auscultation, no accessory muscle use noted. No wheezing, rales, or rhonchi noted b/l  Cardiovascular: Regular rate, regular rhythm, normal S1, S2; no murmurs or rub  Gastrointestinal: Soft, non-tender, non distended, normal bowel sounds  Extremities: HANSEN x 4, mild LE peripheral edema, no cyanosis, no clubbing   Vascular: Equal and normal pulses: 2+ peripheral pulses throughout  Neurological: A+O x 3; speech clear and intact; no gross sensory/motor deficits  Psychiatric: calm, normal mood, normal affect  Skin: warm, dry, well perfused, no rashes  Tubes/Lines: CT removed-sterile dressing in place. RRAline.   Incision: Sternal stable-No audible click, incision C/D/I without obvious bleeding/discharge    Code Status: Full Code       Critical care time spent: 35 minutes (reviewing chart including medication, labs and imaging results, discussions with interdisciplinary team, discussing goals of care/advanced directives, counseling patient and/or family, non-inclusive of procedures)    Case including assessment/plan of care discussed with attending.
SUBJECTIVE:  Pt OOB to the chair, pt states, " I feel great."  Patient denies acute pain with radiating or aggravating factors.  He denies chest pain, shortness of breath, palpitations, headache, dizziness, nausea, or vomiting..    Overnight events:  none    PAST MEDICAL & SURGICAL HISTORY:  Lesion of right lung  right lung adenocarcinoma    Exposure to Agent Orange    History of radiation therapy    Prostate cancer    Testicular cancer    HTN (hypertension)    HLD (hyperlipidemia)    H/O aortic valve insufficiency    History of abdominal aortic aneurysm (AAA)    History of chemotherapy    History of prostate surgery    H/O removal of testicle  left    Encounter for management of wound VAC  left ankle wound debridment        MEDICATIONS  acetaminophen   Tablet .. 975 milliGRAM(s) Oral every 6 hours PRN  aspirin enteric coated 325 milliGRAM(s) Oral daily  atorvastatin 20 milliGRAM(s) Oral at bedtime  enoxaparin Injectable 40 milliGRAM(s) SubCutaneous daily  magnesium sulfate  IVPB 2 Gram(s) IV Intermittent once  melatonin 5 milliGRAM(s) Oral at bedtime  metoprolol tartrate 25 milliGRAM(s) Oral two times a day  ondansetron Injectable 4 milliGRAM(s) IV Push every 4 hours PRN  oxyCODONE    IR 5 milliGRAM(s) Oral every 4 hours PRN  oxyCODONE    IR 10 milliGRAM(s) Oral every 4 hours PRN  pantoprazole    Tablet 40 milliGRAM(s) Oral daily  polyethylene glycol 3350 17 Gram(s) Oral daily PRN  senna 2 Tablet(s) Oral at bedtime  sodium chloride 0.9% lock flush 3 milliLiter(s) IV Push every 8 hours  MEDICATIONS  (PRN):  acetaminophen   Tablet .. 975 milliGRAM(s) Oral every 6 hours PRN Mild Pain (1 - 3)  ondansetron Injectable 4 milliGRAM(s) IV Push every 4 hours PRN Nausea and/or Vomiting  oxyCODONE    IR 5 milliGRAM(s) Oral every 4 hours PRN Moderate Pain (4 - 6)  oxyCODONE    IR 10 milliGRAM(s) Oral every 4 hours PRN Severe Pain (7 - 10)  polyethylene glycol 3350 17 Gram(s) Oral daily PRN Constipation      Daily     Daily Weight in k.6 (11 Oct 2021 06:16)                              11.3   6.22  )-----------( 139      ( 11 Oct 2021 05:34 )             33.2   1011    139  |  103  |  14.6  ----------------------------<  104<H>  4.6   |  27.0  |  0.75    Ca    8.2<L>      11 Oct 2021 05:34  Phos  3.5     10-11  Mg     1.9     10-11    TPro  5.3<L>  /  Alb  3.4  /  TBili  0.8  /  DBili  x   /  AST  16  /  ALT  20  /  AlkPhos  63  10-            Objective:  T(C): 36.8 (10-11-21 @ 06:16), Max: 36.8 (10-10-21 @ 21:34)  HR: 78 (10-11-21 @ 06:16) (62 - 85)  BP: 121/73 (10-11-21 @ 06:16) (100/62 - 121/73)  RR: 18 (10-11-21 @ 06:16) (16 - 18)  SpO2: 94% (10-11-21 @ 06:16) (94% - 97%)  Wt(kg): --CAPILLARY BLOOD GLUCOSE      I&O's Summary    10 Oct 2021 07:01  -  11 Oct 2021 07:00  --------------------------------------------------------  IN: 1010 mL / OUT: 0 mL / NET: 1010 mL        Physical Exam  Neuro: A+O x 3, non-focal, speech clear and intact  Pulm: CTA, equal bilaterally  CV: RRR, +S1S2  Abd: soft, NT, ND, +BS  Ext: +DP Pulses b/l, +PT pulses, no edema  Inc: MSI C/D/I/stable w/ dressing  +PW    Imaging:  CXR:  appears unchanged from previous CXR 10/10, final read pending     ECG:  < from: 12 Lead ECG (10.09.21 @ 05:40) >    Ventricular Rate 84 BPM    Atrial Rate 84 BPM    P-R Interval 164 ms    QRS Duration 124 ms    Q-T Interval 380 ms    QTC Calculation(Bazett) 449 ms    P Axis 57 degrees    R Axis 1 degrees    T Axis 69 degrees    Diagnosis Line Normal sinus rhythm  Non-specific intra-ventricular conduction delay  Borderline ECG    Confirmed by DEBBIE CLAY ([id number]) on 10/11/2021 6:48:25 AM    < end of copied text >            
  POD # 5 s/p Aortic Valve Replacement    PAST MEDICAL & SURGICAL HISTORY:  Lesion of right lung, right lung adenocarcinoma  Exposure to Agent Orange  History of radiation therapy  Prostate cancer  Testicular cancer  HTN (hypertension)  HLD (hyperlipidemia)  H/O aortic valve insufficiency  History of abdominal aortic aneurysm (AAA)  History of chemotherapy  History of prostate surgery  H/O removal of testicle, left  Encounter for management of wound VAC, left ankle wound debridment    FAMILY HISTORY:  Family history of lung cancer (Father)  FH: pancreatic cancer (Father, Mother)  FHx: ovarian cancer (Sibling)    Brief Hospital Course: 70 year old Male with PMHx Severe AS/AI, AAA, PFO, HTN, prostate cancer, testicular ca, right lung adenocarcinoma s/p chemo and xRt now s/p AVR with Dr Peck on 10/7/21. Postoperative course remains uneventful at this time.     Significant recent/past 24 hr events: No overnight events reported.     Subjective: Patient sitting up in bed in no acute distress. +Ambulating well independently. +BMs since surgery. +Pain controlled. Denies fevers, chills, lightheadedness, dizziness, HA, CP, palpitations, SOB, cough, abdominal pain, N/V, diarrhea, numbness/tingling in extremities, or any other acute complaints. ROS negative x 10 systems except as noted above.     MEDICATIONS  (STANDING):  aspirin enteric coated 325 milliGRAM(s) Oral daily  atorvastatin 20 milliGRAM(s) Oral at bedtime  enoxaparin Injectable 40 milliGRAM(s) SubCutaneous daily  melatonin 5 milliGRAM(s) Oral at bedtime  metoprolol tartrate 25 milliGRAM(s) Oral two times a day  pantoprazole    Tablet 40 milliGRAM(s) Oral daily  senna 2 Tablet(s) Oral at bedtime  sodium chloride 0.9% lock flush 3 milliLiter(s) IV Push every 8 hours    MEDICATIONS  (PRN):  acetaminophen   Tablet .. 975 milliGRAM(s) Oral every 6 hours PRN Mild Pain (1 - 3)  ondansetron Injectable 4 milliGRAM(s) IV Push every 4 hours PRN Nausea and/or Vomiting  oxyCODONE    IR 5 milliGRAM(s) Oral every 4 hours PRN Moderate Pain (4 - 6)  oxyCODONE    IR 10 milliGRAM(s) Oral every 4 hours PRN Severe Pain (7 - 10)  polyethylene glycol 3350 17 Gram(s) Oral daily PRN Constipation    Allergies: No Known Allergies    Vitals   T(C): 36.8 (11 Oct 2021 21:36), Max: 36.8 (11 Oct 2021 06:16)  T(F): 98.2 (11 Oct 2021 21:36), Max: 98.2 (11 Oct 2021 06:16)  HR: 79 (11 Oct 2021 21:36) (78 - 94)  BP: 118/72 (11 Oct 2021 21:36) (97/62 - 121/73)  BP(mean): 88 (11 Oct 2021 21:36) (88 - 88)  RR: 18 (11 Oct 2021 21:36) (18 - 18)  SpO2: 92% (11 Oct 2021 21:36) (92% - 96%)    I&O's Detail    10 Oct 2021 07:01  -  11 Oct 2021 07:00  --------------------------------------------------------  IN:    IV PiggyBack: 50 mL    Oral Fluid: 960 mL  Total IN: 1010 mL    OUT:  Total OUT: 0 mL    Total NET: 1010 mL      11 Oct 2021 07:01  -  12 Oct 2021 01:09  --------------------------------------------------------  IN:  Total IN: 0 mL    OUT:    Voided (mL): 700 mL  Total OUT: 700 mL    Total NET: -700 mL    Physical Exam  Neuro: A+O x 3, non-focal, speech clear and intact  HEENT:  NCAT, No conjuctival edema or icterus, no thrush.    Neck:  Supple, trachea midline  Pulm: CTA, good air entry, equal bilaterally, no rales/rhonchi/wheezing, no accessory muscle use noted  CV: regular rate, regular rhythm, +S1S2, no murmur or rub noted  Abd: soft, NT, ND, + BS  Ext: HANSEN x 4, no edema, no cyanosis or clubbing, distal motor/neuro/circ intact  Skin: warm, dry, well perfused  Incisions: midsternal incision C/D/I, sternum stable    LABS                        11.3   6.22  )-----------( 139      ( 11 Oct 2021 05:34 )             33.2     10-11    139  |  103  |  14.6  ----------------------------<  104<H>  4.6   |  27.0  |  0.75    Ca    8.2<L>      11 Oct 2021 05:34  Phos  3.5     10-11  Mg     1.9     10-11    TPro  5.3<L>  /  Alb  3.4  /  TBili  0.8  /  DBili  x   /  AST  16  /  ALT  20  /  AlkPhos  63  10-11      Last CXR:  < from: Xray Chest 1 View- PORTABLE-Routine (Xray Chest 1 View- PORTABLE-Routine in AM.) (10.11.21 @ 05:25) >  Portable view of the chest is compared to 10/10/2021 and demonstrates a normal cardiac silhouette and normal pulmonary vasculature with no consolidation, effusion, gross adenopathy, pneumothorax or acute osseous finding.  Plate like atelectasis in the right upper lobe, unchanged.  IMPRESSION:  No acute radiographic findings and no change  < end of copied text >  
  POD # 3 s/p Aortic Valve Replacement    PAST MEDICAL & SURGICAL HISTORY:  Lesion of right lung, right lung adenocarcinoma  Exposure to Agent Orange  History of radiation therapy  Prostate cancer  Testicular cancer  HTN (hypertension)  HLD (hyperlipidemia)  H/O aortic valve insufficiency  History of abdominal aortic aneurysm (AAA)  History of chemotherapy  History of prostate surgery  H/O removal of testicle, left  Encounter for management of wound VAC, left ankle wound debridment    FAMILY HISTORY:  Family history of lung cancer (Father)  FH: pancreatic cancer (Father, Mother)  FHx: ovarian cancer (Sibling)    Brief Hospital Course: 70 year old Male with PMHx Severe AS/AI, AAA, PFO, HTN, prostate cancer, testicular ca, right lung adenocarcinoma s/p chemo and xRt now s/p AVR with Dr Peck on 10/7/21. Postoperative course remains uneventful at this time.     Significant recent/past 24 hr events: No overnight events reported.     Subjective: Patient sitting up in bed in no acute distress. +Ambulating well independently. +Passing gas, no BM since surgery. +Pain controlled. Denies fevers, chills, lightheadedness, dizziness, HA, CP, palpitations, SOB, cough, abdominal pain, N/V, diarrhea, numbness/tingling in extremities, or any other acute complaints. ROS negative x 10 systems except as noted above.     MEDICATIONS  (STANDING):  aspirin enteric coated 325 milliGRAM(s) Oral daily  atorvastatin 20 milliGRAM(s) Oral at bedtime  enoxaparin Injectable 40 milliGRAM(s) SubCutaneous daily  melatonin 5 milliGRAM(s) Oral at bedtime  metoprolol tartrate 25 milliGRAM(s) Oral two times a day  pantoprazole    Tablet 40 milliGRAM(s) Oral daily  senna 2 Tablet(s) Oral at bedtime  sodium chloride 0.9% lock flush 3 milliLiter(s) IV Push every 8 hours    MEDICATIONS  (PRN):  acetaminophen   Tablet .. 975 milliGRAM(s) Oral every 6 hours PRN Mild Pain (1 - 3)  ondansetron Injectable 4 milliGRAM(s) IV Push every 4 hours PRN Nausea and/or Vomiting  oxyCODONE    IR 5 milliGRAM(s) Oral every 4 hours PRN Moderate Pain (4 - 6)  oxyCODONE    IR 10 milliGRAM(s) Oral every 4 hours PRN Severe Pain (7 - 10)  polyethylene glycol 3350 17 Gram(s) Oral daily PRN Constipation    Allergies: No Known Allergies    Vitals   T(C): 36.7 (09 Oct 2021 21:31), Max: 37.1 (09 Oct 2021 12:00)  T(F): 98.1 (09 Oct 2021 21:31), Max: 98.7 (09 Oct 2021 12:00)  HR: 78 (09 Oct 2021 21:31) (73 - 91)  BP: 120/77 (09 Oct 2021 21:31) (113/73 - 153/82)  BP(mean): 93 (09 Oct 2021 12:00) (82 - 110)  RR: 16 (09 Oct 2021 21:31) (16 - 27)  SpO2: 95% (09 Oct 2021 21:31) (92% - 96%)    I&O's Detail    08 Oct 2021 07:01  -  09 Oct 2021 07:00  --------------------------------------------------------  IN:    Insulin: 1.5 mL    IV PiggyBack: 150 mL    IV PiggyBack: 1000 mL    Oral Fluid: 480 mL    sodium chloride 0.9%: 20 mL    sodium chloride 0.9%: 40 mL  Total IN: 1691.5 mL    OUT:    Chest Tube (mL): 40 mL    Intermittent Catheterization - Urethral (mL): 1895 mL    Voided (mL): 300 mL  Total OUT: 2235 mL    Total NET: -543.5 mL      09 Oct 2021 07:01  -  10 Oct 2021 02:15  --------------------------------------------------------  IN:    IV PiggyBack: 50 mL    IV PiggyBack: 500 mL  Total IN: 550 mL    OUT:    Voided (mL): 320 mL  Total OUT: 320 mL    Total NET: 230 mL    Physical Exam  Neuro: A+O x 3, non-focal, speech clear and intact  HEENT:  NCAT, No conjuctival edema or icterus, no thrush.    Neck:  Supple, trachea midline  Pulm: CTA, good air entry, equal bilaterally, no rales/rhonchi/wheezing, no accessory muscle use noted  Chest: +PW (settings: VVI, rate: 50, mA: 10, sensitivity: 0.8)  CV: regular rate, regular rhythm, +S1S2, no murmur or rub noted  Abd: soft, NT, ND, + BS  Ext: HANSEN x 4, no edema, no cyanosis or clubbing, distal motor/neuro/circ intact  Skin: warm, dry, well perfused  Incisions: midsternal mepilex C/D/I, sternum stable    LABS                        11.3   10.96 )-----------( 100      ( 09 Oct 2021 02:45 )             32.7     10-09    135  |  103  |  19.1  ----------------------------<  124<H>  4.5   |  23.0  |  0.72    Ca    8.0<L>      09 Oct 2021 02:45  Mg     2.0     10-09    TPro  5.2<L>  /  Alb  3.5  /  TBili  0.6  /  DBili  x   /  AST  26  /  ALT  13  /  AlkPhos  56  10-08    POCT Blood Glucose.: 115 mg/dL (10-09-21 @ 11:36)  POCT Blood Glucose.: 118 mg/dL (10-09-21 @ 07:45)      Last CXR:  < from: Xray Chest 1 View- PORTABLE-Routine (Xray Chest 1 View- PORTABLE-Routine in AM.) (10.09.21 @ 06:05) >  Impression:  Initial film is from 10/8/2021 at 4:24 AM. [place] catheter sheath overlies the right internal jugular vein region. Multiple wires overlie the patient's chest. Nasogastric tube has been removed. Drain overlies the mediastinum. Heart is normal in size. Lungs are clear. Right apical pleural thickening is seen.  Subsequent film is from today at 4:32 AM shows the tubes and lines been removed. No pneumothorax.  < end of copied text >  
Significant recent/past 24 hr events:  No acute overnight events. Pt remained HD stable and without acute complaints.  Pt currently in NAD and denies N/V/D HA, dizziness, blurry vision, numbness/tingling, SOB, cough, chest pain, palpitations, abd pain or urinary sx's.     Subjective:  Review of Systems   ROS negative x 10 systems except as noted above    Patient is a 70y old  Male who presents with a chief complaint of s/p AVR (07 Oct 2021 11:29)    HPI:  70 year old male with HTN, prostate cancer, testicular ca, right lung adenocarcinoma s/p chemo and xRT, PFO, AAA, and severe aortic stenosis present today for PST.  He had an abnormal EKG with changes compared to previous one a few years ago which prompted further cardiac evaluation. He is status post radiation treatment and chemotherapy for right-sided lung adenocarcinoma.  Recent imaging and work up revealed severe aortic stenosis.  He report c/o dizziness first thing in the morning and sometimes when getting up from a seated position. denies fatigue or chest pain. He is now schedule for Aortic valve replacement on 10/7/2021 with Dr. Peck    Cardiac Catheterization from 6/28/21 at [facility]  - Normal Coronary Arteries    Transthoracic Echocardiogram from 7/20/21 at [facility]   - LVEF 45%  - There is moderate LVH  - Moderately enlarged left atrium  - Dilation of the ascending aorta and root.   - Mild thickening and calcification of the anterior and posterior mitral valve leaflets, no evidence of mitral valve regurgitation.  - Peak transaortic gradient equals 28.2 mmHg, MAVG 14.4 mmHg, the calculated aortic valve area equals 0.95 cm2, by the continuity equation consistent with severe aortic stenosis. The dimensionless Index value is 0.24.   Echocardiogram in April 2021 showed significant progression of aortic stenosis to severe. DVI 0.2. Aortic valve area 0.6 cm². CT arctic valve calcium score more than 2500.     Abdominal aortic ultrasound also showed mild dilation of proximal aorta to 3.2 cm. This is unchanged from August 2019. He is asymptomatic for dilated abdominal aorta  - EKG 08/07/19: Sinus rhythm. T. inversion in inferior leads and V6.  - Echocardiogram September 2016: Normal LV function. Mild aortic stenosis. Aortic root 4.7 cm. Ascending aorta 4.4 cm   - abdominal ultrasound September 2016: Proximal aorta 3.0 cm.      (27 Sep 2021 14:00)    PAST MEDICAL & SURGICAL HISTORY:  Lesion of right lung  right lung adenocarcinoma    Exposure to Agent Orange    History of radiation therapy    Prostate cancer    Testicular cancer    HTN (hypertension)    HLD (hyperlipidemia)    H/O aortic valve insufficiency    History of abdominal aortic aneurysm (AAA)    History of chemotherapy    History of prostate surgery    H/O removal of testicle  left    Encounter for management of wound VAC  left ankle wound debridment    FAMILY HISTORY:  Family history of lung cancer (Father)    FH: pancreatic cancer (Father, Mother)    FHx: ovarian cancer (Sibling)    Vitals   ICU Vital Signs Last 24 Hrs  T(C): 36.9 (08 Oct 2021 00:00), Max: 36.9 (08 Oct 2021 00:00)  T(F): 98.4 (08 Oct 2021 00:00), Max: 98.4 (08 Oct 2021 00:00)  HR: 80 (08 Oct 2021 03:00) (63 - 80)  BP: 122/75 (08 Oct 2021 00:00) (83/53 - 132/86)  BP(mean): 92 (08 Oct 2021 00:00) (63 - 103)  ABP: 138/65 (08 Oct 2021 03:00) (79/45 - 141/72)  ABP(mean): 89 (08 Oct 2021 03:00) (56 - 98)  RR: 14 (08 Oct 2021 03:00) (10 - 23)  SpO2: 96% (08 Oct 2021 03:00) (93% - 100%)    VENT SETTINGS   Mode: CPAP with PS  FiO2: 40  PEEP: 5  MAP: 9  PC: 5    ABG - ( 07 Oct 2021 11:59 )  pH, Arterial: 7.470 pH, Blood: x     /  pCO2: 30    /  pO2: 207   / HCO3: 22    / Base Excess: -1.9  /  SaO2: 99.8      I&O's Detail    07 Oct 2021 07:01  -  08 Oct 2021 03:07  --------------------------------------------------------  IN:    Albumin 5%  - 250 mL: 250 mL    Insulin: 33 mL    IV PiggyBack: 100 mL    IV PiggyBack: 100 mL    IV PiggyBack: 100 mL    IV PiggyBack: 100 mL    IV PiggyBack: 500 mL    Lactated Ringers Bolus: 1000 mL    Lactated Ringers Bolus: 1000 mL    Norepinephrine: 28.8 mL    Oral Fluid: 290 mL    Propofol: 52.5 mL    sodium chloride 0.9%: 85 mL    sodium chloride 0.9%: 170 mL    Vasopressin: 14.1 mL  Total IN: 3823.4 mL    OUT:    Chest Tube (mL): 920 mL    Intermittent Catheterization - Urethral (mL): 1980 mL  Total OUT: 2900 mL    Total NET: 923.4 mL    LABS                        11.7   9.78  )-----------( 101      ( 08 Oct 2021 02:24 )             33.4     10-08    137  |  105  |  12.6  ----------------------------<  129<H>  4.8   |  22.0  |  0.76    Ca    8.2<L>      08 Oct 2021 02:24  Mg     1.8     10-08    TPro  5.2<L>  /  Alb  3.5  /  TBili  0.6  /  DBili  x   /  AST  26  /  ALT  13  /  AlkPhos  56  10-08    LIVER FUNCTIONS - ( 08 Oct 2021 02:24 )  Alb: 3.5 g/dL / Pro: 5.2 g/dL / ALK PHOS: 56 U/L / ALT: 13 U/L / AST: 26 U/L / GGT: x           PT/INR - ( 07 Oct 2021 12:12 )   PT: 14.6 sec;   INR: 1.27 ratio         PTT - ( 07 Oct 2021 12:12 )  PTT:53.5 sec  CARDIAC MARKERS ( 08 Oct 2021 02:24 )   U/L / CKMBx     / Troponin T0.21 ng/mL /  CARDIAC MARKERS ( 07 Oct 2021 12:12 )   U/L / CKMB17.3 ng/mL / Troponin T0.25 ng/mL /      POCT Blood Glucose.: 127 mg/dL (10-08-21 @ 02:00)  POCT Blood Glucose.: 124 mg/dL (10-08-21 @ 00:53)  POCT Blood Glucose.: 119 mg/dL (10-07-21 @ 23:42)  POCT Blood Glucose.: 120 mg/dL (10-07-21 @ 22:34)  POCT Blood Glucose.: 117 mg/dL (10-07-21 @ 21:35)  POCT Blood Glucose.: 135 mg/dL (10-07-21 @ 19:41)  POCT Blood Glucose.: 129 mg/dL (10-07-21 @ 18:38)  POCT Blood Glucose.: 150 mg/dL (10-07-21 @ 17:24)  POCT Blood Glucose.: 161 mg/dL (10-07-21 @ 16:20)  POCT Blood Glucose.: 151 mg/dL (10-07-21 @ 15:03)  POCT Blood Glucose.: 160 mg/dL (10-07-21 @ 14:21)  POCT Blood Glucose.: 158 mg/dL (10-07-21 @ 13:26)  POCT Blood Glucose.: 101 mg/dL (10-07-21 @ 06:30)        MEDICATIONS  (STANDING):  aspirin enteric coated 325 milliGRAM(s) Oral daily  atorvastatin 20 milliGRAM(s) Oral at bedtime  cefuroxime  IVPB 1500 milliGRAM(s) IV Intermittent every 8 hours  chlorhexidine 2% Cloths 1 Application(s) Topical daily  dextrose 50% Injectable 50 milliLiter(s) IV Push every 15 minutes  dextrose 50% Injectable 25 milliLiter(s) IV Push every 15 minutes  enoxaparin Injectable 40 milliGRAM(s) SubCutaneous daily  insulin regular Infusion 2 Unit(s)/Hr (2 mL/Hr) IV Continuous <Continuous>  magnesium sulfate  IVPB 2 Gram(s) IV Intermittent once  melatonin 5 milliGRAM(s) Oral at bedtime  metoprolol tartrate 12.5 milliGRAM(s) Oral two times a day  pantoprazole    Tablet 40 milliGRAM(s) Oral daily  potassium chloride  10 mEq/50 mL IVPB 10 milliEquivalent(s) IV Intermittent every 1 hour  potassium chloride  10 mEq/50 mL IVPB 10 milliEquivalent(s) IV Intermittent every 1 hour  potassium chloride  10 mEq/50 mL IVPB 10 milliEquivalent(s) IV Intermittent every 1 hour  senna 2 Tablet(s) Oral at bedtime  sodium chloride 0.9%. 1000 milliLiter(s) (10 mL/Hr) IV Continuous <Continuous>  sodium chloride 0.9%. 1000 milliLiter(s) (5 mL/Hr) IV Continuous <Continuous>  vancomycin  IVPB 1500 milliGRAM(s) IV Intermittent every 12 hours    MEDICATIONS  (PRN):  fentaNYL    Injectable 50 MICROGram(s) IV Push every 30 minutes PRN Moderate Pain (4 - 6)  ondansetron Injectable 4 milliGRAM(s) IV Push every 4 hours PRN Nausea and/or Vomiting  oxyCODONE    IR 5 milliGRAM(s) Oral every 4 hours PRN Moderate Pain (4 - 6)  oxyCODONE    IR 10 milliGRAM(s) Oral every 4 hours PRN Severe Pain (7 - 10)      Allergies:  No Known Allergies      Physical Exam:   Constitutional: NAD  Neck: supple, trachea midline.  No JVD  Respiratory: Breath Sounds equal & clear bilaterally to auscultation, no accessory muscle use noted. No wheezing, rales, or rhonchi noted b/l  Cardiovascular: Regular rate, regular rhythm, normal S1, S2; no murmurs or rub  Gastrointestinal: Soft, non-tender, non distended, normal bowel sounds  Extremities: HANSEN x 4, mild LE peripheral edema, no cyanosis, no clubbing   Vascular: Equal and normal pulses: 2+ peripheral pulses throughout  Neurological: A+O x 3; speech clear and intact; no gross sensory/motor deficits  Psychiatric: calm, normal mood, normal affect  Skin: warm, dry, well perfused, no rashes  Tubes/Lines: Mediastinal CT-No obvious air leak noted. RIJ cordis, RRAline.   Incision: Sternal stable-No audible click, incision C/D/I without obvious bleeding/discharge  CT: Mediastinal CT to suction, no AL.    Code Status: Full Code       Critical care time spent: 35 minutes (reviewing chart including medication, labs and imaging results, discussions with interdisciplinary team, discussing goals of care/advanced directives, counseling patient and/or family, non-inclusive of procedures)    Case including assessment/plan of care discussed with attending.

## 2021-10-12 NOTE — DISCHARGE NOTE NURSING/CASE MANAGEMENT/SOCIAL WORK - NSDCPEFALRISK_GEN_ALL_CORE
For information on Fall & injury Prevention, visit [email or web address]

## 2021-10-12 NOTE — PROGRESS NOTE ADULT - PROBLEM SELECTOR PLAN 4
Post testicular surgical intervention (removal Lt testicle).
Post testicular surgical intervention (removal Lt testicle).
Post testicular surgical intervention (removal Lt testicle)
Post testicular surgical intervention (removal Lt testicle).
Post testicular surgical intervention (removal Lt testicle)

## 2021-10-12 NOTE — PROGRESS NOTE ADULT - PROBLEM SELECTOR PROBLEM 7
Adenocarcinoma, lung, right

## 2021-10-12 NOTE — PROGRESS NOTE ADULT - PROBLEM SELECTOR PLAN 6
Lovenox and SCDs for DVT prophylaxis.  Protonix for GI prophylaxis.  Continue with bowel regimen.    Dispo: Home later today 10/12.  Plan to be discussed / reviewed with CT Surgery attending / team during AM rounds.
Lovenox and SCDs for DVT prophylaxis.  Protonix for GI prophylaxis.  Continue with bowel regimen.    Plan to be discussed / reviewed with CT Surgery attending / team during AM rounds.
Cont post-op GI, DVT, ABX prophylaxis
Lovenox and SCDs for DVT prophylaxis.  Protonix for GI prophylaxis.  Continue with bowel regimen.    Dispo: home 10/12    PLAN of care discussed with Dr. Peck and CTS team in AM rounds
Cont post-op GI, DVT, ABX prophylaxis

## 2021-10-12 NOTE — PROGRESS NOTE ADULT - ASSESSMENT
70 year old Male with PMHx Severe AS/AI, AAA, PFO, HTN, prostate cancer, testicular ca, right lung adenocarcinoma s/p chemo and xRt now s/p AVR with Dr Peck on 10/7/21. Postoperative course remains uneventful at this time. 
71y/o M with PMHx Severe AS/AI, AAA, PFO, HTN, prostate cancer, testicular ca, right lung adenocarcinoma s/p chemo and xRt now s/p AVR with Dr Peck on 10/7/21.  POD#2
70 year old Male with PMHx Severe AS/AI, AAA, PFO, HTN, prostate cancer, testicular ca, right lung adenocarcinoma s/p chemo and xRt now s/p AVR with Dr Peck on 10/7/21. Postoperative course remains uneventful at this time. 
70 year old Male with PMHx Severe AS/AI, AAA, PFO, HTN, prostate cancer, testicular ca, right lung adenocarcinoma s/p chemo and xRt now s/p AVR with Dr Peck on 10/7/21. Postoperative course remains uneventful at this time. 
71y/o M with PMHx Severe AS/AI, AAA, PFO, HTN, prostate cancer, testicular ca, right lung adenocarcinoma s/p chemo and xRt now s/p AVR with Dr Peck on 10/7/21.  POD#1

## 2021-10-12 NOTE — PROGRESS NOTE ADULT - PROBLEM SELECTOR PLAN 2
Continue lopressor as tolerated by HR and BP.
Start BB in AM  Continuous HD and pulse ox monitoring
Cont lopressor, titrate as tolerated.  Continuous HD and pulse ox monitoring
Continue lopressor as tolerated by HR and BP.
Continue lopressor as tolerated by HR and BP.

## 2021-10-12 NOTE — DISCHARGE NOTE NURSING/CASE MANAGEMENT/SOCIAL WORK - PATIENT PORTAL LINK FT
You can access the FollowMyHealth Patient Portal offered by [facility] by registering at the following website: [email or web address]. By joining [employer]s FollowMyHealth portal, you will also be able to view your health information using other applications (apps) compatible with our system.

## 2021-10-12 NOTE — PROGRESS NOTE ADULT - PROBLEM SELECTOR PLAN 7
Post radiation treatment.

## 2021-10-12 NOTE — DISCHARGE NOTE NURSING/CASE MANAGEMENT/SOCIAL WORK - NSSCNAMETXT_GEN_ALL_CORE
Relevant Problems   No relevant active problems       Anesthetic History   No history of anesthetic complications            Review of Systems / Medical History  Patient summary reviewed and pertinent labs reviewed    Pulmonary  Within defined limits                 Neuro/Psych   Within defined limits           Cardiovascular    Hypertension              Exercise tolerance: >4 METS     GI/Hepatic/Renal     GERD           Endo/Other        Obesity     Other Findings              Physical Exam    Airway  Mallampati: II  TM Distance: 4 - 6 cm  Neck ROM: normal range of motion   Mouth opening: Normal     Cardiovascular    Rhythm: regular  Rate: normal      Pertinent negatives: No murmur   Dental  No notable dental hx       Pulmonary  Breath sounds clear to auscultation               Abdominal         Other Findings            Anesthetic Plan    ASA: 2  Anesthesia type: total IV anesthesia          Induction: Intravenous  Anesthetic plan and risks discussed with: Patient
[facility]

## 2021-10-12 NOTE — PROGRESS NOTE ADULT - PROBLEM SELECTOR PLAN 1
S/p AVR on 10/7/21 with Dr. Peck.  Neurologically intact.  Hemodynamically stable.  Continue lopressor as tolerated by HR and BP.   Continue ASA, Lipitor.  Continue coughing and deep breathing exercises/incentive spirometry instructed and encouraged.  Follow up CXR.   No diuretics needed at this time. Replenish electrolytes PRN to keep K>4 and Mg>2.  OOB to chair and PT daily.   Oxy PRN for analgesia.  Continue bowel regimen PRN.   Plan to be discussed / reviewed with CT Surgery attending / team during AM rounds.
POD#1 AVR with Dr Peck   Neurologically intact  Hemodynamically stable, start lopressor this AM  Cont ASA, Lipitor  Extubated yesterday, oxygenating well, coughing and deep breathing exercises/incentive spirometry instructed and encouraged  Dysphagia screen passed, clear liquid diet and advance as tolerated  No diuresis at this time, replete electrolytes PRN, herrera catheter to remain in place POD #1 for urine output monitoring in critically ill patient  Cont insulin infusion with FS Q1H (titrated per CTICU protocol) for glycemic control  OOB to chair and PT consult this morning  PRN for analgesia  Continue with supportive ICU care as needed  Above plan of care to be discussed with CT surgical team on am rounds
POD#2 AVR with Dr Peck   -Neurologically intact  -Hemodynamically stable  Cont lopressor  Cont ASA, Lipitor  Cont coughing and deep breathing exercises/incentive spirometry instructed and encouraged  Replete electrolytes PRN, herrera catheter to remain for urine output monitoring in critically ill patient  Cont ACHS, tight GLU control (A1C 5.7)  OOB to chair and PT consult this morning  PRN for analgesia  Continue with supportive ICU care as needed  Above plan of care to be discussed with CT surgical team on am rounds  De-Intensify in AM and Floor downgrade.
S/p AVR on 10/7/21 with Dr. Peck.  Neurologically intact.  Hemodynamically stable.  Continue lopressor as tolerated by HR and BP.   Continue ASA, Lipitor.  Continue coughing and deep breathing exercises/incentive spirometry instructed and encouraged.  Follow up CXR.   No diuretics needed at this time. Replenish electrolytes PRN to keep K>4 and Mg>2.  OOB to chair and PT daily.   Oxy PRN for analgesia.  Continue bowel regimen PRN.  PW to be removed today   shower 10/11 in the afternoon
S/p AVR on 10/7/21 with Dr. Peck.  Neurologically intact.  Hemodynamically stable.  Continue lopressor as tolerated by HR and BP.   Continue ASA, Lipitor.  Continue coughing and deep breathing exercises/incentive spirometry instructed and encouraged.  Follow up CXR.   No diuretics needed at this time. Replenish electrolytes PRN to keep K>4 and Mg>2.  OOB to chair and PT daily.   Oxy PRN for analgesia.  Continue bowel regimen PRN.

## 2021-10-13 ENCOUNTER — APPOINTMENT (OUTPATIENT)
Dept: CARE COORDINATION | Facility: HOME HEALTH | Age: 71
End: 2021-10-13
Payer: COMMERCIAL

## 2021-10-13 VITALS
WEIGHT: 209.2 LBS | RESPIRATION RATE: 16 BRPM | OXYGEN SATURATION: 95 % | BODY MASS INDEX: 30.89 KG/M2 | HEART RATE: 78 BPM | SYSTOLIC BLOOD PRESSURE: 108 MMHG | DIASTOLIC BLOOD PRESSURE: 72 MMHG

## 2021-10-13 PROCEDURE — 99024 POSTOP FOLLOW-UP VISIT: CPT

## 2021-10-13 RX ORDER — PNV NO.95/FERROUS FUM/FOLIC AC 28MG-0.8MG
TABLET ORAL
Refills: 0 | Status: DISCONTINUED | COMMUNITY
End: 2021-10-13

## 2021-10-13 RX ORDER — UBIDECARENONE/VIT E ACET 100MG-5
1000 CAPSULE ORAL
Refills: 0 | Status: DISCONTINUED | COMMUNITY
End: 2021-10-13

## 2021-10-13 RX ORDER — MUPIROCIN 20 MG/G
2 OINTMENT TOPICAL TWICE DAILY
Qty: 1 | Refills: 0 | Status: DISCONTINUED | COMMUNITY
Start: 2021-09-29 | End: 2021-10-13

## 2021-10-13 RX ORDER — METOPROLOL SUCCINATE 25 MG/1
25 TABLET, EXTENDED RELEASE ORAL DAILY
Qty: 45 | Refills: 3 | Status: DISCONTINUED | COMMUNITY
End: 2021-10-13

## 2021-10-13 NOTE — PHYSICAL EXAM
[Sclera] : the sclera and conjunctiva were normal [PERRL With Normal Accommodation] : pupils were equal in size, round, and reactive to light [Extraocular Movements] : extraocular movements were intact [Neck Appearance] : the appearance of the neck was normal [Jugular Venous Distention Increased] : there was no jugular-venous distention [Respiration, Rhythm And Depth] : normal respiratory rhythm and effort [Exaggerated Use Of Accessory Muscles For Inspiration] : no accessory muscle use [Auscultation Breath Sounds / Voice Sounds] : lungs were clear to auscultation bilaterally [Apical Impulse] : the apical impulse was normal [Heart Rate And Rhythm] : heart rate was normal and rhythm regular [Heart Sounds] : normal S1 and S2 [Murmurs] : no murmurs [Heart Sounds Pericardial Friction Rub] : no pericardial rub [Examination Of The Chest] : the chest was normal in appearance [Chest Visual Inspection Thoracic Asymmetry] : no chest asymmetry [Diminished Respiratory Excursion] : normal chest expansion [FreeTextEntry1] : MSI well approximated. No erythema, warmth or drainage. Sternum stable. CT sites healing well slight SS oozing from L PCT site,  [2+] : left 2+ [Fingers] :  capillary refill of the fingers was normal [Toes] :  capillary refill of the toes was normal [Bowel Sounds] : normal bowel sounds [Abdomen Soft] : soft [Abdomen Tenderness] : non-tender [Abnormal Walk] : normal gait [Nail Clubbing] : no clubbing  or cyanosis of the fingernails [Motor Tone] : muscle strength and tone were normal [Skin Color & Pigmentation] : normal skin color and pigmentation [Skin Turgor] : normal skin turgor [] : no rash [No Focal Deficits] : no focal deficits [Oriented To Time, Place, And Person] : oriented to person, place, and time [Impaired Insight] : insight and judgment were intact [Affect] : the affect was normal [Mood] : the mood was normal

## 2021-10-13 NOTE — CDI QUERY NOTE - NSCDIOTHERTXTBX_GEN_ALL_CORE_HH
10/7 PROCEDURES:  Aortic valve replacement    < from: Xray Chest 1 View- PORTABLE-Routine (Xray Chest 1 View- PORTABLE-Routine in AM.) (10.10.21 @ 04:29) >    IMPRESSION:  No acute radiographic findings, mild platelike atelectasis in the right upper lobe, unchanged    < end of copied text >    < from: Xray Chest 1 View- PORTABLE-Routine (Xray Chest 1 View- PORTABLE-Routine in AM.) (10.12.21 @ 03:24) >    IMPRESSION:  Platelike atelectasis in the right upper lobe, unchanged    < end of copied text >    Treatment incentive spirometry    Please specify the clinical significance of chest x-ray findings?    1) Post op atelectasis  2) Other  3) Not clinically significant

## 2021-10-13 NOTE — REVIEW OF SYSTEMS
[Sleep Disturbances] : no sleep disturbances [Anxiety] : no anxiety [Depression] : no depression [Negative] : Neurological [FreeTextEntry7] : Last BM yesterday afternoon.

## 2021-10-13 NOTE — ASSESSMENT
[FreeTextEntry1] : WDWN, older male doing well s/p AVR.\par \par Problems:\par 1. HTN/PFO/AAA/AI/AS: s/p AVR (t).\par Plan:\par c/w daily weights, temps and showers.\par Continue with current meds: ASA, Atorvastatin, & Metoprolol.\par c/w Tylenol/Oxycodone PRN pain.\par c/w Senna PRN constipation.\par Educated on all medication indications & side effects.\par Keep legs elevated.\par Ambulate as tolerated.\par Incentive spirometry.\par Coughing & deep breathing exercises.\par Homecare- [facility].\par Diet- low salt, low fat.\par Education provided on diet. \par Cardiac rehab recommended when cleared by cardiology.\par f/u appts - CTS, Cardiology & PCP.\par [facility] team will continue to f/u with pt status. \par Pt agrees to call with any questions, issues or concerns.\par

## 2021-10-13 NOTE — HISTORY OF PRESENT ILLNESS
[FreeTextEntry1] : As per EMR:\par \par Hospital Course: 70 year old Male with PMHx Severe AS/AI, AAA, PFO, HTN, prostate cancer, testicular ca, right lung adenocarcinoma s/p chemo and xRt now s/p AVR with Dr Peck on 10/7/21. Postoperative course remains uneventful at this time. Patient remains hemodynamically stable. Plan to discharge home later today as per Dr. Peck.\par \par Pt. discharged on 10/13/21.\par \par Labs & studies reviewed.\par \par Pt. is seen & examined in home today in routine follow-up.\par \par Pt. states that he is feeling well. He had a migraine overnight that was relieved with Oxycodone. He states he has no incisional pain. He is tolerating diet, meds & activity. He offers no complaints at this time.\par \par Cardiac surgery discharge instructions reviewed with pt. understanding.  \par All questions answered. Emotional support offered.\par [facility]par Reminded pt. about [facility] program and ACP role.  \par Pt. has yellow card and contact phone number.  \par Encouraged to call with any further questions or concerns.\par \par \par

## 2021-10-14 LAB — SURGICAL PATHOLOGY STUDY: SIGNIFICANT CHANGE UP

## 2021-10-16 ENCOUNTER — TRANSCRIPTION ENCOUNTER (OUTPATIENT)
Age: 71
End: 2021-10-16

## 2021-10-18 ENCOUNTER — NON-APPOINTMENT (OUTPATIENT)
Age: 71
End: 2021-10-18

## 2021-10-25 ENCOUNTER — APPOINTMENT (OUTPATIENT)
Dept: CARDIOTHORACIC SURGERY | Facility: CLINIC | Age: 71
End: 2021-10-25
Payer: COMMERCIAL

## 2021-10-25 VITALS
BODY MASS INDEX: 30.96 KG/M2 | WEIGHT: 209 LBS | TEMPERATURE: 97.3 F | HEIGHT: 69 IN | OXYGEN SATURATION: 97 % | SYSTOLIC BLOOD PRESSURE: 120 MMHG | HEART RATE: 73 BPM | DIASTOLIC BLOOD PRESSURE: 70 MMHG

## 2021-10-25 PROCEDURE — 99024 POSTOP FOLLOW-UP VISIT: CPT

## 2021-10-26 ENCOUNTER — APPOINTMENT (OUTPATIENT)
Dept: CARDIOLOGY | Facility: CLINIC | Age: 71
End: 2021-10-26
Payer: COMMERCIAL

## 2021-10-26 VITALS
BODY MASS INDEX: 31.25 KG/M2 | WEIGHT: 211 LBS | HEIGHT: 69 IN | TEMPERATURE: 98 F | DIASTOLIC BLOOD PRESSURE: 78 MMHG | OXYGEN SATURATION: 97 % | SYSTOLIC BLOOD PRESSURE: 118 MMHG | HEART RATE: 71 BPM

## 2021-10-26 DIAGNOSIS — Z86.79 PERSONAL HISTORY OF OTHER DISEASES OF THE CIRCULATORY SYSTEM: ICD-10-CM

## 2021-10-26 PROCEDURE — 99214 OFFICE O/P EST MOD 30 MIN: CPT

## 2021-10-26 RX ORDER — OXYCODONE 5 MG/1
5 TABLET ORAL EVERY 6 HOURS
Refills: 0 | Status: DISCONTINUED | COMMUNITY
Start: 2021-10-13 | End: 2021-10-26

## 2021-10-26 NOTE — PHYSICAL EXAM
[General Appearance - Well Developed] : well developed [Normal Appearance] : normal appearance [Well Groomed] : well groomed [General Appearance - Well Nourished] : well nourished [No Deformities] : no deformities [General Appearance - In No Acute Distress] : no acute distress [Normal Conjunctiva] : the conjunctiva exhibited no abnormalities [Eyelids - No Xanthelasma] : the eyelids demonstrated no xanthelasmas [Normal Oral Mucosa] : normal oral mucosa [No Oral Pallor] : no oral pallor [No Oral Cyanosis] : no oral cyanosis [Respiration, Rhythm And Depth] : normal respiratory rhythm and effort [Exaggerated Use Of Accessory Muscles For Inspiration] : no accessory muscle use [Auscultation Breath Sounds / Voice Sounds] : lungs were clear to auscultation bilaterally [Heart Rate And Rhythm] : heart rate and rhythm were normal [Heart Sounds] : normal S1 and S2 [FreeTextEntry1] : Prosthetic A2.  Short systolic murmur. [Abdomen Soft] : soft [Abdomen Tenderness] : non-tender [Abnormal Walk] : normal gait [Gait - Sufficient For Exercise Testing] : the gait was sufficient for exercise testing [Nail Clubbing] : no clubbing of the fingernails [Cyanosis, Localized] : no localized cyanosis [Skin Color & Pigmentation] : normal skin color and pigmentation [] : no rash [No Venous Stasis] : no venous stasis [Skin Lesions] : no skin lesions [No Skin Ulcers] : no skin ulcer [No Xanthoma] : no  xanthoma was observed [Oriented To Time, Place, And Person] : oriented to person, place, and time [Affect] : the affect was normal [Mood] : the mood was normal [Memory Recent] : recent memory was not impaired [No Anxiety] : not feeling anxious

## 2021-10-26 NOTE — REASON FOR VISIT
[Structural Heart and Valve Disease] : structural heart and valve disease [Follow-Up - Clinic] : a clinic follow-up of [FreeTextEntry2] : for issues outlined below

## 2021-10-26 NOTE — REVIEW OF SYSTEMS
[Dyspnea on exertion] : not dyspnea during exertion [Negative] : Heme/Lymph [FreeTextEntry5] : Slight postural lightheadedness on rare occasions

## 2021-10-26 NOTE — DISCUSSION/SUMMARY
[FreeTextEntry1] : \par 70-year-old male with history of aortic stenosis with exertional lightheadedness, AAA, hypertension, hyperlipidemia, prostate cancer many years ago, lung cancer for which he  underwent chemotherapy and radiation treatment  ( stage IIIa, N2, M0 adenocarcinoma right lung).  Chemotherapy Therapy included carboplatin and Alimta \par \par Status post porcine aortic valve replacement for severe aortic stenosis.  Patient had follow-up with Dr. Peck  yesterday who felt that the patient had recovered well and has signed off case\par \par Post replacement baseline echocardiogram in 4 weeks.  Hopefully LVEF and wall motions have resolved compared to last echocardiogram in July 2021 at [place] prior to AVR\par \par Continue current medications including full dose aspirin\par \par Increase activity.  He has slight shortness of breath on activity which should improve.  He does not want to join cardiac rehab.\par \par Patient would need SBE prophylaxis for indicated procedures.  He understands this.\par \par OV after echo\par \par

## 2021-10-26 NOTE — ASSESSMENT
[FreeTextEntry1] : Tests:\par - EKG 08/07/19: Sinus rhythm. T. inversion in inferior leads and V6.\par - Labs May 2019: FBS 87,  abnormal creatinine. Normal LFT.\par - Echocardiogram September 2016: Normal LV function. Mild aortic stenosis. Aortic root 4.7 cm. Ascending aorta 4.4 cm she had\par - abdominal ultrasound September 2016: Proximal aorta 3.0 cm.\par - Stress echocardiogram August 2019: Exercised 6:30 minutes on Syed protocol. No angina. No ischemia by stress echocardiogram. Exercise PASP 36.\par \par Review today:\par -Echocardiogram August 2019: Ejection fraction 60%. Aortic root 4.5 cm. Mild-to-moderate aortic stenosis.\par - Abd Aortic US Aug 2019: prox aorta 3.4 cm\par -Abdominal aorta ultrasound February 2020: Proximal aorta 3.2 cm\par -Echocardiogram February 2020: Moderate AS, moderate LVH, normal EF, normal PASP.  Ascending aorta 4.5 cm\par -SHANDA October 2020: Aortic valve area 1.1 cm² by planimetry.  Peak aortic valve gradient 36 mmHg.\par - Echocardiogram in April 2021 showed significant progression of aortic stenosis to severe.  DVI 0.2. Aortic valve area 0.6 cm².  \par - CT arctic valve calcium score more than 2500.  Also CAD\par - Abdominal aortic ultrasound also showed mild dilation of proximal aorta to 3.2 cm.  This is unchanged from August 2019.  He is asymptomatic for dilated abdominal aorta\par -April 2021 carotid ultrasound did not have significant stenosis\par -7/2021 cath\par - echo July 2021: EF 45% with global mild hypokinesis.  Basal inferoseptal and basal inferior akinesis.  Grade 1 diastolic dysfunction.

## 2021-10-26 NOTE — HISTORY OF PRESENT ILLNESS
[FreeTextEntry1] : Nolan is a pleasant 71-year-old male with history of aortic stenosis with exertional lightheadedness, AAA, hypertension, hyperlipidemia, prostate cancer many years ago, lung cancer for which he  underwent chemotherapy and radiation treatment  ( stage IIIa, N2, M0 adenocarcinoma right lung).  Chemotherapy Therapy included carboplatin and Alimta here for post diagnostic cardiac catheterization follow-up.  Right radial artery access site clean dry intact.\par \par No significant coronary disease.  Cath was performed as preoperative evaluation for severe aortic stenosis\par \par Echocardiogram in April 2021 showed significant progression of aortic stenosis to severe.  DVI 0.2. Aortic valve area 0.6 cm².  CT arctic valve calcium score more than 2500. status post Aortic Valve Replacement # 29 Mullen II porcine valve.\par \par Abdominal aortic ultrasound also showed mild dilation of proximal aorta to 3.2 cm.  This is unchanged from August 2019.  He is asymptomatic for dilated abdominal aorta\par \par Most recent carotid ultrasound did not have significant stenosis

## 2021-11-10 ENCOUNTER — TRANSCRIPTION ENCOUNTER (OUTPATIENT)
Age: 71
End: 2021-11-10

## 2021-11-22 ENCOUNTER — APPOINTMENT (OUTPATIENT)
Dept: CARDIOLOGY | Facility: CLINIC | Age: 71
End: 2021-11-22
Payer: COMMERCIAL

## 2021-11-22 VITALS
BODY MASS INDEX: 28.44 KG/M2 | WEIGHT: 210 LBS | SYSTOLIC BLOOD PRESSURE: 110 MMHG | HEIGHT: 72 IN | HEART RATE: 82 BPM | OXYGEN SATURATION: 96 % | DIASTOLIC BLOOD PRESSURE: 80 MMHG | TEMPERATURE: 97.8 F

## 2021-11-22 PROCEDURE — 99214 OFFICE O/P EST MOD 30 MIN: CPT

## 2021-11-22 PROCEDURE — 93306 TTE W/DOPPLER COMPLETE: CPT

## 2021-11-22 RX ORDER — SENNA 8.6 MG/1
8.6 TABLET, FILM COATED ORAL
Refills: 0 | Status: DISCONTINUED | COMMUNITY
Start: 2021-10-13 | End: 2021-11-22

## 2021-11-22 RX ORDER — ACETAMINOPHEN 325 MG/1
325 TABLET ORAL EVERY 6 HOURS
Refills: 0 | Status: DISCONTINUED | COMMUNITY
Start: 2021-10-13 | End: 2021-11-22

## 2021-11-22 NOTE — HISTORY OF PRESENT ILLNESS
[FreeTextEntry1] : Nolan is a pleasant 71-year-old male with history of aortic stenosis with exertional lightheadedness, AAA, hypertension, hyperlipidemia, prostate cancer many years ago, lung cancer for which he  underwent chemotherapy and radiation treatment  ( stage IIIa, N2, M0 adenocarcinoma right lung).  Chemotherapy Therapy included carboplatin and Alimta here for post diagnostic cardiac catheterization follow-up.  \par No significant coronary disease. \par Status post porcine aortic valve replacement for severe aortic stenosis.  Patient had follow-up with Dr. Peck   who felt that the patient had recovered well and has signed off case. He had follow-up echo today which demonstrated improvement in EF to 50-55%. Post-op shortness resolved approx 2 weeks ago. Active with no new exertional complaints. \par Prior cardiac testing as listed below.\par \par Today he denies chest pain, pressure, unusual shortness of breath, lightheadedness, dizziness, near syncope or syncope. \par \par

## 2021-11-22 NOTE — PHYSICAL EXAM
[General Appearance - Well Developed] : well developed [Normal Appearance] : normal appearance [Well Groomed] : well groomed [General Appearance - Well Nourished] : well nourished [No Deformities] : no deformities [General Appearance - In No Acute Distress] : no acute distress [Normal Conjunctiva] : the conjunctiva exhibited no abnormalities [Eyelids - No Xanthelasma] : the eyelids demonstrated no xanthelasmas [Normal Oral Mucosa] : normal oral mucosa [No Oral Pallor] : no oral pallor [No Oral Cyanosis] : no oral cyanosis [Respiration, Rhythm And Depth] : normal respiratory rhythm and effort [Exaggerated Use Of Accessory Muscles For Inspiration] : no accessory muscle use [Auscultation Breath Sounds / Voice Sounds] : lungs were clear to auscultation bilaterally [Heart Rate And Rhythm] : heart rate and rhythm were normal [Heart Sounds] : normal S1 and S2 [Abdomen Soft] : soft [Abdomen Tenderness] : non-tender [Abnormal Walk] : normal gait [Gait - Sufficient For Exercise Testing] : the gait was sufficient for exercise testing [Nail Clubbing] : no clubbing of the fingernails [Cyanosis, Localized] : no localized cyanosis [Skin Color & Pigmentation] : normal skin color and pigmentation [] : no rash [No Venous Stasis] : no venous stasis [Skin Lesions] : no skin lesions [No Skin Ulcers] : no skin ulcer [No Xanthoma] : no  xanthoma was observed [Oriented To Time, Place, And Person] : oriented to person, place, and time [Affect] : the affect was normal [Mood] : the mood was normal [Memory Recent] : recent memory was not impaired [No Anxiety] : not feeling anxious [FreeTextEntry1] : Prosthetic A2.  Short systolic murmur.

## 2021-11-22 NOTE — ASSESSMENT
[FreeTextEntry1] : Tests:\par - EKG 08/07/19: Sinus rhythm. T. inversion in inferior leads and V6.\par - Labs May 2019: FBS 87,  abnormal creatinine. Normal LFT.\par - Echocardiogram September 2016: Normal LV function. Mild aortic stenosis. Aortic root 4.7 cm. Ascending aorta 4.4 cm she had\par - abdominal ultrasound September 2016: Proximal aorta 3.0 cm.\par - Stress echocardiogram August 2019: Exercised 6:30 minutes on Syed protocol. No angina. No ischemia by stress echocardiogram. Exercise PASP 36.\par \par -Echocardiogram August 2019: Ejection fraction 60%. Aortic root 4.5 cm. Mild-to-moderate aortic stenosis.\par - Abd Aortic US Aug 2019: prox aorta 3.4 cm\par -Abdominal aorta ultrasound February 2020: Proximal aorta 3.2 cm\par -Echocardiogram February 2020: Moderate AS, moderate LVH, normal EF, normal PASP.  Ascending aorta 4.5 cm\par -SHANDA October 2020: Aortic valve area 1.1 cm² by planimetry.  Peak aortic valve gradient 36 mmHg.\par - Echocardiogram in April 2021 showed significant progression of aortic stenosis to severe.  DVI 0.2. Aortic valve area 0.6 cm².  \par - CT arctic valve calcium score more than 2500.  Also CAD\par - Abdominal aortic ultrasound also showed mild dilation of proximal aorta to 3.2 cm.  This is unchanged from August 2019.  He is asymptomatic for dilated abdominal aorta\par -April 2021 carotid ultrasound did not have significant stenosis\par -7/2021 cath\par - echo July 2021: EF 45% with global mild hypokinesis.  Basal inferoseptal and basal inferior akinesis.  Grade 1 diastolic dysfunction.

## 2021-11-22 NOTE — DISCUSSION/SUMMARY
[FreeTextEntry1] : ISHAN DRAPER  is a 71 year M  who presents today Nov 22, 2021 with the above history and the following active issues. \par \par History of aortic stenosis with exertional lightheadedness, AAA, hypertension, hyperlipidemia,\par Status post porcine aortic valve replacement for severe aortic stenosis.  Patient had follow-up with Dr. Peck  post op who felt that the patient had recovered well and has signed off case.\par \par Echocardiogram today reviewed and LVEF has improved to 50-55% compared to last echocardiogram in July 2021 at [place] prior to AVR\par \par Continue current medications including full dose aspirin\par \par Increase activity.  He has slight shortness of breath on activity which should improve.  He does not want to join cardiac rehab.\par \par Patient would need SBE prophylaxis for indicated procedures.  He understands this.\par \par Prostate cancer many years ago, lung cancer for which he  underwent chemotherapy and radiation treatment  ( stage IIIa, N2, M0 adenocarcinoma right lung).  Chemotherapy Therapy included carboplatin and Alimta \par \par Red flag symptoms which would warrant sooner emergent evaluation reviewed with the patient. \par Questions and concerns were addressed and answered. \par \par Clinical follow-up with Dr. Myers in 6 months unless symptoms warrant sooner emergent evaluation. \par \par Sincerely,\par \par Yanique Keller PA-C\par Patients history, testing and plan reviewed with supervising MD: Dr. Anjana Vitale \par

## 2022-01-04 ENCOUNTER — NON-APPOINTMENT (OUTPATIENT)
Age: 72
End: 2022-01-04

## 2022-10-13 ENCOUNTER — APPOINTMENT (OUTPATIENT)
Dept: CARDIOLOGY | Facility: CLINIC | Age: 72
End: 2022-10-13

## 2022-10-13 VITALS
HEIGHT: 72 IN | SYSTOLIC BLOOD PRESSURE: 118 MMHG | HEART RATE: 61 BPM | OXYGEN SATURATION: 96 % | DIASTOLIC BLOOD PRESSURE: 64 MMHG | BODY MASS INDEX: 28.71 KG/M2 | WEIGHT: 212 LBS

## 2022-10-13 PROCEDURE — 99214 OFFICE O/P EST MOD 30 MIN: CPT

## 2022-10-13 RX ORDER — AMOXICILLIN 500 MG/1
500 CAPSULE ORAL DAILY
Qty: 4 | Refills: 0 | Status: DISCONTINUED | COMMUNITY
Start: 2022-01-06 | End: 2022-10-13

## 2022-10-14 NOTE — PHYSICAL EXAM
[Normal S1, S2] : normal S1, S2 [Normal] : clear lung fields, good air entry, no respiratory distress [No Edema] : no edema [Alert and Oriented] : alert and oriented [de-identified] :  No JVD.  Transmitted murmur  [de-identified] : Prosthetic A2.  Short systolic murmur.

## 2022-10-14 NOTE — DISCUSSION/SUMMARY
[FreeTextEntry1] : 70-year-old male with history of aortic stenosis with exertional lightheadedness, AAA, hypertension, hyperlipidemia, prostate cancer many years ago, lung cancer for which he  underwent chemotherapy and radiation treatment  ( stage IIIa, N2, M0 adenocarcinoma right lung).  Chemotherapy Therapy included carboplatin and Alimta \par \par Status post porcine aortic valve replacement for severe aortic stenosis. He continues to feel well since his sx.\par -pt is due for repeat echo. Obtain 2d echocardiogram to evaluate resting heart structure, valvular function, and LVEF. Also need to monitor Aortic Root- last was 4.3\par \par Continue current medications including full dose aspirin\par \par Increase activity.  Previously declined cardiac rehab. \par \par Patient would need SBE prophylaxis for indicated procedures.  He understands this.\par \par HLD- due for repeat labs\par Cont on Lipitor 20mg\par \par F/U after testing to review results.\par Discussed red flag symptoms, which would warrant sooner or emergent medical evaluation.\par Any questions and concerns were addressed and resolved. \par \par Sincerely,\par \par Nel Crespo ANP-C\par Patients history, testing, and plan reviewed with supervising MD: Dr. Ja Myers MD, FAC, [facility] \par

## 2022-10-14 NOTE — HISTORY OF PRESENT ILLNESS
[FreeTextEntry1] : Nolan is a pleasant 71-year-old male with history of aortic stenosis with exertional lightheadedness, AAA, hypertension, hyperlipidemia, prostate cancer many years ago, lung cancer for which he  underwent chemotherapy and radiation treatment  ( stage IIIa, N2, M0 adenocarcinoma right lung).  Chemotherapy Therapy included carboplatin and Alimta here for post diagnostic cardiac catheterization follow-up.  Right radial artery access site clean dry intact.\par \par Today he presents for his annual visit. He is doing well since his valve replacement in 10/2021. He denies chest pain, pressure, palpitations, unusual shortness of breath, LYMAN, orthopnea, LE edema, near syncope or syncope. \par No significant coronary disease.  Cath was performed as preoperative evaluation for severe aortic stenosis\par \par He continues to complain of intermittent positional lightheadedness which has been ongoing and unchanged. Pt admits to not drinking much water and will focus on doing so. \par

## 2022-10-14 NOTE — ASSESSMENT
[FreeTextEntry1] : Tests:\par - EKG 08/07/19: Sinus rhythm. T. inversion in inferior leads and V6.\par - Labs May 2019: FBS 87,  abnormal creatinine. Normal LFT.\par - Echocardiogram September 2016: Normal LV function. Mild aortic stenosis. Aortic root 4.7 cm. Ascending aorta 4.4 cm she had\par - abdominal ultrasound September 2016: Proximal aorta 3.0 cm.\par - Stress echocardiogram August 2019: Exercised 6:30 minutes on Syed protocol. No angina. No ischemia by stress echocardiogram. Exercise PASP 36.\par -Echocardiogram August 2019: Ejection fraction 60%. Aortic root 4.5 cm. Mild-to-moderate aortic stenosis.\par - Abd Aortic US Aug 2019: prox aorta 3.4 cm\par -Abdominal aorta ultrasound February 2020: Proximal aorta 3.2 cm\par -Echocardiogram February 2020: Moderate AS, moderate LVH, normal EF, normal PASP.  Ascending aorta 4.5 cm\par -SHANDA October 2020: Aortic valve area 1.1 cm² by planimetry.  Peak aortic valve gradient 36 mmHg.\par \par - Echocardiogram in April 2021 showed significant progression of aortic stenosis to severe.  DVI 0.2. Aortic valve area 0.6 cm².  \par - CT arctic valve calcium score more than 2500.  Also CAD\par - Abdominal aortic ultrasound also showed mild dilation of proximal aorta to 3.2 cm.  This is unchanged from August 2019.  He is asymptomatic for dilated abdominal aorta\par -April 2021 carotid ultrasound did not have significant stenosis\par -7/2021 cath\par - echo July 2021: EF 45% with global mild hypokinesis.  Basal inferoseptal and basal inferior akinesis.  Grade 1 diastolic dysfunction.

## 2022-10-26 ENCOUNTER — APPOINTMENT (OUTPATIENT)
Dept: CARDIOLOGY | Facility: CLINIC | Age: 72
End: 2022-10-26

## 2022-10-26 PROCEDURE — 93306 TTE W/DOPPLER COMPLETE: CPT

## 2022-11-04 ENCOUNTER — APPOINTMENT (OUTPATIENT)
Dept: CARDIOLOGY | Facility: CLINIC | Age: 72
End: 2022-11-04

## 2022-11-04 VITALS
HEART RATE: 62 BPM | SYSTOLIC BLOOD PRESSURE: 120 MMHG | BODY MASS INDEX: 28.17 KG/M2 | WEIGHT: 208 LBS | HEIGHT: 72 IN | DIASTOLIC BLOOD PRESSURE: 84 MMHG | OXYGEN SATURATION: 98 % | TEMPERATURE: 97.2 F

## 2022-11-04 PROCEDURE — 99214 OFFICE O/P EST MOD 30 MIN: CPT

## 2022-11-04 NOTE — DISCUSSION/SUMMARY
[FreeTextEntry1] : 72-year-old male with history of aortic stenosis with exertional lightheadedness, AAA, hypertension, hyperlipidemia, prostate cancer many years ago, lung cancer for which he  underwent chemotherapy and radiation treatment  ( stage IIIa, N2, M0 adenocarcinoma right lung).  Chemotherapy Therapy included carboplatin and Alimta \par \par Status post porcine aortic valve replacement for severe aortic stenosis. He continues to feel well since his sx.\par -pt had repeat echo. \par Continue current medications including full dose aspirin\par \par Increase activity.  Previously declined cardiac rehab. \par \par Patient would need SBE prophylaxis for indicated procedures.  He understands this.\par \par HLD- due for repeat labs\par Cont on Lipitor 20mg\par \par Continue present medications.  Echocardiogram reviewed.  No significant changes.  Normal EF.  Cardiac follow-up 6 months and as needed.

## 2022-11-04 NOTE — PHYSICAL EXAM
[Normal S1, S2] : normal S1, S2 [Normal] : clear lung fields, good air entry, no respiratory distress [No Edema] : no edema [Alert and Oriented] : alert and oriented [de-identified] :  No JVD.  Transmitted murmur  [de-identified] : Prosthetic A2.  Short systolic murmur.

## 2022-11-04 NOTE — HISTORY OF PRESENT ILLNESS
[FreeTextEntry1] : Nolan is a pleasant 72-year-old male with history of aortic stenosis with exertional lightheadedness, AAA, hypertension, hyperlipidemia, prostate cancer many years ago, lung cancer for which he  underwent chemotherapy and radiation treatment  ( stage IIIa, N2, M0 adenocarcinoma right lung).  Chemotherapy Therapy included carboplatin and Alimta here for post diagnostic cardiac catheterization follow-up.  Right radial artery access site clean dry intact.\par \par Today he presents for his annual visit. He is doing well since his valve replacement in 10/2021. He denies chest pain, pressure, palpitations, unusual shortness of breath, LYMAN, orthopnea, LE edema, near syncope or syncope. \par No significant coronary disease.  Cath was performed as preoperative evaluation for severe aortic stenosis\par \par He continues to complain of intermittent positional lightheadedness which has been ongoing and unchanged. Pt admits to not drinking much water and will focus on doing so. \par

## 2023-08-03 RX ORDER — METOPROLOL TARTRATE 25 MG/1
25 TABLET, FILM COATED ORAL
Qty: 180 | Refills: 2 | Status: ACTIVE | COMMUNITY
Start: 2021-10-13 | End: 1900-01-01

## 2023-11-09 ENCOUNTER — APPOINTMENT (OUTPATIENT)
Dept: CARDIOLOGY | Facility: CLINIC | Age: 73
End: 2023-11-09
Payer: MEDICARE

## 2023-11-09 VITALS
SYSTOLIC BLOOD PRESSURE: 122 MMHG | WEIGHT: 214 LBS | HEIGHT: 72 IN | RESPIRATION RATE: 12 BRPM | HEART RATE: 67 BPM | OXYGEN SATURATION: 97 % | BODY MASS INDEX: 28.99 KG/M2 | DIASTOLIC BLOOD PRESSURE: 76 MMHG

## 2023-11-09 PROCEDURE — 99214 OFFICE O/P EST MOD 30 MIN: CPT

## 2023-11-09 RX ORDER — ASPIRIN/ACETAMINOPHEN/CAFFEINE 500-325-65
325 POWDER IN PACKET (EA) ORAL
Refills: 0 | Status: DISCONTINUED | COMMUNITY
Start: 2021-05-07 | End: 2023-11-09

## 2023-11-09 RX ORDER — MULTIVIT-MIN/IRON/FOLIC ACID/K 18-600-40
50 MCG CAPSULE ORAL
Refills: 0 | Status: ACTIVE | COMMUNITY

## 2023-11-09 RX ORDER — COLD-HOT PACK
50 EACH MISCELLANEOUS
Refills: 0 | Status: ACTIVE | COMMUNITY

## 2023-12-06 ENCOUNTER — APPOINTMENT (OUTPATIENT)
Dept: CARDIOLOGY | Facility: CLINIC | Age: 73
End: 2023-12-06

## 2023-12-19 ENCOUNTER — APPOINTMENT (OUTPATIENT)
Dept: CARDIOLOGY | Facility: CLINIC | Age: 73
End: 2023-12-19
Payer: MEDICARE

## 2023-12-19 PROCEDURE — 93979 VASCULAR STUDY: CPT

## 2023-12-20 ENCOUNTER — APPOINTMENT (OUTPATIENT)
Dept: CARDIOLOGY | Facility: CLINIC | Age: 73
End: 2023-12-20
Payer: MEDICARE

## 2023-12-20 PROCEDURE — 99214 OFFICE O/P EST MOD 30 MIN: CPT

## 2023-12-20 NOTE — HISTORY OF PRESENT ILLNESS
[FreeTextEntry1] : Nolan is a pleasant 73-year-old male with history of aortic stenosis status post AVR, AAA ( 3.1 cms in 2021), hypertension, hyperlipidemia, prostate cancer many years ago, lung cancer for which he underwent chemotherapy and radiation treatment  ( stage IIIa, N2, M0 adenocarcinoma right lung).  Chemotherapy Therapy included carboplatin and Alimta. Follow-up abd US performed yesterday demonstrated stable aortic dimensions.   He is doing well since his valve replacement in 10/2021. He denies chest pain, pressure, palpitations, unusual shortness of breath, LYMAN, orthopnea, LE edema, near syncope or syncope.   No significant coronary disease.  Cath was performed as preoperative evaluation for severe aortic stenosis  He continues to complain of intermittent positional lightheadedness which has been ongoing and unchanged. Pt admits to not drinking much water and will focus on doing so.

## 2023-12-20 NOTE — PHYSICAL EXAM
[Normal S1, S2] : normal S1, S2 [Normal] : clear lung fields, good air entry, no respiratory distress [No Edema] : no edema [Alert and Oriented] : alert and oriented [de-identified] :  No JVD.  Transmitted murmur  [de-identified] : Prosthetic A2.  Short systolic murmur.

## 2023-12-20 NOTE — DISCUSSION/SUMMARY
[FreeTextEntry1] : ISHAN DRAPER  is a 73 year M  who presents today Dec 20, 2023 with the above history and the following active issues.   Status post porcine aortic valve replacement for severe aortic stenosis. He continues to feel well since his sx. Follow-up echo will be performed at the time of his next follow-up.  Continue current medications including full dose aspirin  HLD- Fasting lipid panel from Nov 2023 with LDL at goal. Cont on Lipitor 20mg  Patient would need SBE prophylaxis for indicated procedures.  He understands this.  Complains of small lump, reducible, in the midline above the umbilicus.  Possibly ventral hernia?  Refer to Dr. Encarnacion.  History of borderline dilation of abdominal aorta in 2021.  Updated echo with proximal AAA 3.2 x 3.3cm. No significant change since 2021.  Continue present medications.  Previous echocardiogram reviewed.  No significant changes.  Normal EF.    Red flag symptoms which would warrant sooner emergent evaluation reviewed with the patient.  Questions and concerns were addressed and answered.   Sincerely,  Yanique Keller PA-C Patients history, testing and plan reviewed with supervising MD: Dr. Breana Cowan

## 2024-05-09 RX ORDER — ATORVASTATIN CALCIUM 20 MG/1
20 TABLET, FILM COATED ORAL
Qty: 90 | Refills: 1 | Status: ACTIVE | COMMUNITY
Start: 2021-05-07 | End: 1900-01-01

## 2024-06-21 ENCOUNTER — APPOINTMENT (OUTPATIENT)
Dept: CARDIOLOGY | Facility: CLINIC | Age: 74
End: 2024-06-21
Payer: MEDICARE

## 2024-06-21 VITALS
HEIGHT: 72 IN | SYSTOLIC BLOOD PRESSURE: 126 MMHG | OXYGEN SATURATION: 98 % | DIASTOLIC BLOOD PRESSURE: 78 MMHG | BODY MASS INDEX: 28.71 KG/M2 | HEART RATE: 64 BPM | RESPIRATION RATE: 12 BRPM | WEIGHT: 212 LBS

## 2024-06-21 DIAGNOSIS — Z92.21 PERSONAL HISTORY OF ANTINEOPLASTIC CHEMOTHERAPY: ICD-10-CM

## 2024-06-21 DIAGNOSIS — I71.40 ABDOMINAL AORTIC ANEURYSM, WITHOUT RUPTURE, UNSPECIFIED: ICD-10-CM

## 2024-06-21 DIAGNOSIS — Z92.3 PERSONAL HISTORY OF IRRADIATION: ICD-10-CM

## 2024-06-21 DIAGNOSIS — I10 ESSENTIAL (PRIMARY) HYPERTENSION: ICD-10-CM

## 2024-06-21 DIAGNOSIS — Q21.12 PATENT FORAMEN OVALE: ICD-10-CM

## 2024-06-21 DIAGNOSIS — I77.810 THORACIC AORTIC ECTASIA: ICD-10-CM

## 2024-06-21 DIAGNOSIS — Z85.46 PERSONAL HISTORY OF MALIGNANT NEOPLASM OF PROSTATE: ICD-10-CM

## 2024-06-21 DIAGNOSIS — E78.2 MIXED HYPERLIPIDEMIA: ICD-10-CM

## 2024-06-21 DIAGNOSIS — Z85.118 PERSONAL HISTORY OF OTHER MALIGNANT NEOPLASM OF BRONCHUS AND LUNG: ICD-10-CM

## 2024-06-21 PROCEDURE — 93306 TTE W/DOPPLER COMPLETE: CPT

## 2024-06-21 PROCEDURE — 99214 OFFICE O/P EST MOD 30 MIN: CPT

## 2024-06-21 PROCEDURE — G2211 COMPLEX E/M VISIT ADD ON: CPT

## 2024-06-21 NOTE — HISTORY OF PRESENT ILLNESS
[FreeTextEntry1] : Nolan is a pleasant 73-year-old male with history of aortic stenosis status post AVR, AAA ( 3.1 cms in proximal aorta in 2021), hypertension, hyperlipidemia, prostate cancer many years ago, lung cancer for which he underwent chemotherapy and radiation treatment  ( stage IIIa, N2, M0 adenocarcinoma right lung).  Chemotherapy Therapy included carboplatin and Alimta.  Follow-up abd US performed December 2023 demonstrated stable aortic dimensions.   He is doing well since his valve replacement in 10/2021. He denies chest pain, pressure, palpitations, unusual shortness of breath, LYMAN, orthopnea, LE edema, near syncope or syncope.   No significant coronary disease.  Cath was performed as preoperative evaluation for severe aortic stenosis  He continues to complain of intermittent positional lightheadedness which has been ongoing and unchanged. Pt admits to not drinking much water and will focus on doing so.   Echocardiogram was performed today on 6/21/2024.  Essentially unchanged.  Stable and normally functioning TAVR.

## 2024-06-21 NOTE — DISCUSSION/SUMMARY
[FreeTextEntry1] : ISHAN DRAPER  is a 73 year M  who presents today Dec 20, 2023 with the above history and the following active issues.   Status post porcine aortic valve replacement for severe aortic stenosis. He continues to feel well since his surgery.  Follow-up echo done today.  Independently interpreted by me.  Normal EF.  Stable functioning TAVR.  Continue current medications including full dose aspirin  HLD- Fasting lipid panel from Nov 2023 with LDL at goal. Cont on Lipitor 20mg.  He will have yearly fasting labs with primary care.  Patient would need SBE prophylaxis for indicated procedures.  He understands this.  Complains of small lump, reducible, in the midline above the umbilicus.  Possibly ventral hernia?  Refer to Dr. Encarnacion.  This was previously provided to him.  He lost the contact info.  History of borderline dilation of abdominal aorta; ultrasound of abdominal aorta in December 2023 showed proximal AAA 3.2 x 3.3cm. No significant change since 2021.  Follow-up at 12 months.  Continue present medications.  Understands importance of tight control of blood pressure.   Red flag symptoms which would warrant sooner emergent evaluation reviewed with the patient.  Questions and concerns were addressed and answered.   Thank you for this referral and allowing me to participate in the care of this patient.  If I can be of any further help or  if you have any questions, please do not hesitate to contact me   Sincerely,  Ja Myers MD, FACC, [facility]

## 2024-06-21 NOTE — PHYSICAL EXAM
[Normal S1, S2] : normal S1, S2 [Normal] : clear lung fields, good air entry, no respiratory distress [No Edema] : no edema [Alert and Oriented] : alert and oriented [de-identified] :  No JVD.  Transmitted murmur  [de-identified] : Prosthetic A2.  Short systolic murmur.

## 2024-10-24 ENCOUNTER — APPOINTMENT (OUTPATIENT)
Dept: CARDIOLOGY | Facility: CLINIC | Age: 74
End: 2024-10-24
Payer: MEDICARE

## 2024-10-24 ENCOUNTER — NON-APPOINTMENT (OUTPATIENT)
Age: 74
End: 2024-10-24

## 2024-10-24 VITALS
DIASTOLIC BLOOD PRESSURE: 72 MMHG | OXYGEN SATURATION: 98 % | HEIGHT: 72 IN | SYSTOLIC BLOOD PRESSURE: 132 MMHG | HEART RATE: 69 BPM | WEIGHT: 210 LBS | BODY MASS INDEX: 28.44 KG/M2

## 2024-10-24 DIAGNOSIS — Z92.3 PERSONAL HISTORY OF IRRADIATION: ICD-10-CM

## 2024-10-24 DIAGNOSIS — Z85.47 PERSONAL HISTORY OF MALIGNANT NEOPLASM OF TESTIS: ICD-10-CM

## 2024-10-24 DIAGNOSIS — I77.810 THORACIC AORTIC ECTASIA: ICD-10-CM

## 2024-10-24 DIAGNOSIS — Z77.098 CONTACT WITH AND (SUSPECTED) EXPOSURE TO OTHER HAZARDOUS, CHIEFLY NONMEDICINAL, CHEMICALS: ICD-10-CM

## 2024-10-24 DIAGNOSIS — Q21.12 PATENT FORAMEN OVALE: ICD-10-CM

## 2024-10-24 DIAGNOSIS — I10 ESSENTIAL (PRIMARY) HYPERTENSION: ICD-10-CM

## 2024-10-24 DIAGNOSIS — E78.2 MIXED HYPERLIPIDEMIA: ICD-10-CM

## 2024-10-24 DIAGNOSIS — Z85.46 PERSONAL HISTORY OF MALIGNANT NEOPLASM OF PROSTATE: ICD-10-CM

## 2024-10-24 DIAGNOSIS — I71.40 ABDOMINAL AORTIC ANEURYSM, WITHOUT RUPTURE, UNSPECIFIED: ICD-10-CM

## 2024-10-24 DIAGNOSIS — Z87.891 PERSONAL HISTORY OF NICOTINE DEPENDENCE: ICD-10-CM

## 2024-10-24 DIAGNOSIS — Z85.118 PERSONAL HISTORY OF OTHER MALIGNANT NEOPLASM OF BRONCHUS AND LUNG: ICD-10-CM

## 2024-10-24 DIAGNOSIS — Z92.21 PERSONAL HISTORY OF ANTINEOPLASTIC CHEMOTHERAPY: ICD-10-CM

## 2024-10-24 PROCEDURE — G2211 COMPLEX E/M VISIT ADD ON: CPT

## 2024-10-24 PROCEDURE — 99213 OFFICE O/P EST LOW 20 MIN: CPT

## 2024-10-24 PROCEDURE — 93000 ELECTROCARDIOGRAM COMPLETE: CPT

## 2024-10-24 NOTE — HISTORY OF PRESENT ILLNESS
[FreeTextEntry1] : Vignesh is a 74yoM with PMHx: AAA (3.1cm pAorta 2021), severe AR s/p AVR, HTN, HLD, PFO.  Also: Prostate CA, Lung CA s/p Chemo/Radiation (Stage IIIa, N2, M0 adenocarcinoma Right Lung. Chemo included Carboplatin and Alimta).   He presents in clinic for preoperative cardiovascular clearance pending Hernia repair with Dr. Ga at [facility] on 11/18/24.  He reports feeling well since last seen in the office in June 2024, no recent illnesses or hospitalizations. No change in meds.  He is asymptomatic from a CV and arrythmia standpoint.  BP is well controlled today at 132/72.  He is somewhat active, walks daily, climbs stairs without difficulty, functional status >4 METS. He has no exertional sx.  There is no exertional chest pain, pressure or discomfort. There is no significant dyspnea on exertion or orthopnea. There are no symptomatic palpitations, lightheadedness, dizziness or syncope.   Review of recent testing includes:  Echo June 2024 LVEF 52%, moderate diastolic dysfunction. LA mod dilated, AAA 4.6cm. PASP 38mmHg. Mild MS, Mild MR, Mild PA, Mild TR, bioprosthetic AV no prosthetic AS, no intravalvular regurg. Abdominal US 2023 Prox AAA small 3.2x3.3cm, mild atherosclerosis. No significant change from 2021.  Cardiac Cath 2021 No evidence of significant CAD.

## 2024-10-24 NOTE — PHYSICAL EXAM
[Well Developed] : well developed [No Carotid Bruit] : no carotid bruit [Normal S1, S2] : normal S1, S2 [No Murmur] : no murmur [Clear Lung Fields] : clear lung fields [Good Air Entry] : good air entry [No Edema] : no edema [Moves all extremities] : moves all extremities [Alert and Oriented] : alert and oriented

## 2024-10-24 NOTE — DISCUSSION/SUMMARY
[FreeTextEntry1] : GAGAN DAILEY is a 74 year old M who presents today Oct 23, 2024 with the above history and the following active issues:   HTN: Well controlled. Only on Metoprolol, continue.   HLD: LDL was 47 in 2023. No more recent lipid profile available for my review. We will attempt to obtain from PCP. Continue Atorvastatin.   AAA: Echo 2024 shows AAA 4.2cm. Abd US 2023 shows 3.2x3.3cm. CT Abd/Pelvis with contrast with runoff should be done to accurately obtain measurements of AAA. This may be done after hernia repair.  Continue Metoprolol Tartrate 25mg QHS.  Relationship between blood pressure and progression of dz discussed, pt understands importance of tight BP control. Nonpharmacologic means of reducing BP discussed with him.   VHD, AS s/p AVR: Mild VHD, Bioprosthetic AV with no evidence of stenosis or intravalvular leak on most recent Echo June 2024.  Ongoing surveillance monitoring.   Preoperative Cardiovascular Clearance:  At present, there are no unstable coronary syndromes, decompensated heart failure, severe valvular heart disease or significant dysrhythmias. Baseline functional status is acceptable. Prior testing as outlined above. Risk not attenuated with further CV testing. The clinical benefit of the proposed procedure outweighs the associated cardiovascular risk.  Limitations of non-invasive testing reviewed. Red flag symptoms which would warrant sooner emergent evaluation reviewed with the patient. All questions and concerns were addressed and answered.   Sincerely,   Christiana Motley, [facility] Patient's history, testing, and plan reviewed with supervising MD: Dr. Jules Clayton  [EKG obtained to assist in diagnosis and management of assessed problem(s)] : EKG obtained to assist in diagnosis and management of assessed problem(s)

## 2024-10-26 NOTE — PROCEDURE NOTE - NSINFORMCONSENT_GEN_A_CORE
No Benefits, risks, and possible complications of procedure explained to patient/caregiver who verbalized understanding and gave written consent.

## 2024-12-20 ENCOUNTER — APPOINTMENT (OUTPATIENT)
Dept: CARDIOLOGY | Facility: CLINIC | Age: 74
End: 2024-12-20
Payer: MEDICARE

## 2024-12-20 VITALS
OXYGEN SATURATION: 96 % | HEART RATE: 65 BPM | HEIGHT: 72 IN | DIASTOLIC BLOOD PRESSURE: 70 MMHG | SYSTOLIC BLOOD PRESSURE: 110 MMHG | WEIGHT: 214 LBS | BODY MASS INDEX: 28.99 KG/M2 | RESPIRATION RATE: 12 BRPM

## 2024-12-20 DIAGNOSIS — I77.810 THORACIC AORTIC ECTASIA: ICD-10-CM

## 2024-12-20 DIAGNOSIS — Z85.118 PERSONAL HISTORY OF OTHER MALIGNANT NEOPLASM OF BRONCHUS AND LUNG: ICD-10-CM

## 2024-12-20 DIAGNOSIS — I10 ESSENTIAL (PRIMARY) HYPERTENSION: ICD-10-CM

## 2024-12-20 DIAGNOSIS — Z92.21 PERSONAL HISTORY OF ANTINEOPLASTIC CHEMOTHERAPY: ICD-10-CM

## 2024-12-20 DIAGNOSIS — Q21.12 PATENT FORAMEN OVALE: ICD-10-CM

## 2024-12-20 DIAGNOSIS — I71.40 ABDOMINAL AORTIC ANEURYSM, WITHOUT RUPTURE, UNSPECIFIED: ICD-10-CM

## 2024-12-20 DIAGNOSIS — E78.2 MIXED HYPERLIPIDEMIA: ICD-10-CM

## 2024-12-20 PROCEDURE — G2211 COMPLEX E/M VISIT ADD ON: CPT

## 2024-12-20 PROCEDURE — 99214 OFFICE O/P EST MOD 30 MIN: CPT

## 2024-12-20 PROCEDURE — 93978 VASCULAR STUDY: CPT

## 2024-12-20 NOTE — DISCUSSION/SUMMARY
[FreeTextEntry1] : GAGAN DAILEY is a 74 year old M with the following active issues:   HTN: Well controlled. Only on Metoprolol, continue.   HLD: LDL was 47 in 2023. Continue Atorvastatin.  Lab slip given to the patient.  Echo 2024 shows ascending aorta of 4.2cm. Abd US 2023 shows 3.2x3.3cm. Continue Metoprolol Tartrate 25mg QHS.  Relationship between blood pressure and progression of dz discussed, pt understands importance of tight BP control. Nonpharmacologic means of reducing BP discussed with him.   VHD, AS s/p AVR: Mild VHD, Bioprosthetic AV with no evidence of stenosis or intravalvular leak on most recent Echo June 2024.  Ongoing surveillance monitoring.

## 2024-12-20 NOTE — HISTORY OF PRESENT ILLNESS
[FreeTextEntry1] : Vignesh is a 74yoM with PMHx: AAA (3.1cm pAorta 2021), severe AR s/p AVR, HTN, HLD, PFO.  Also: Prostate CA, Lung CA s/p Chemo/Radiation (Stage IIIa, N2, M0 adenocarcinoma Right Lung. Chemo included Carboplatin and Alimta).   No recent illnesses or hospitalizations. No change in meds.  He is asymptomatic from a CV and arrythmia standpoint.  BP is well controlled  He is somewhat active, walks daily, climbs stairs without difficulty, functional status >4 METS. He has no exertional sx.  There is no exertional chest pain, pressure or discomfort. There is no significant dyspnea on exertion or orthopnea. There are no symptomatic palpitations, lightheadedness, dizziness or syncope.   Review of recent testing includes:  Echo June 2024 LVEF 52%, moderate diastolic dysfunction. LA mod dilated, Asc. Aorta 4.6cm. PASP 38mmHg. Mild MS, Mild MR, Mild [place], Mild TR, bioprosthetic AV no prosthetic AS, no intravalvular regurg. Abdominal US 2023 Prox AAA small 3.2x3.3cm, mild atherosclerosis. No significant change from 2021.  Cardiac Cath 2021 No evidence of significant CAD.  Abdominal ultrasound December 2024: Proximal aorta 3.1 cm.  No change

## 2025-04-10 NOTE — PHYSICAL THERAPY INITIAL EVALUATION ADULT - MUSCLE TONE ASSESSMENT, REHAB EVAL
emergent signs and symptoms and when to seek care at the emergency department and/or call 911     Completed Refills   Requested Prescriptions      No prescriptions requested or ordered in this encounter       Orders Placed This Encounter   Procedures    CT HEAD WO CONTRAST     Standing Status:   Future     Number of Occurrences:   1     Expected Date:   4/10/2025     Expiration Date:   4/10/2026    CBC     Standing Status:   Future     Number of Occurrences:   1     Expected Date:   4/10/2025     Expiration Date:   4/10/2026    Comprehensive Metabolic Panel     Standing Status:   Future     Number of Occurrences:   1     Expected Date:   4/10/2025     Expiration Date:   4/10/2026    TSH reflex to FT4     Standing Status:   Future     Number of Occurrences:   1     Expected Date:   4/10/2025     Expiration Date:   4/10/2026    Magnesium     Standing Status:   Future     Number of Occurrences:   1     Expected Date:   4/10/2025     Expiration Date:   4/10/2026    hCG, Serum, Qualitative     Standing Status:   Future     Number of Occurrences:   1     Expected Date:   4/10/2025     Expiration Date:   4/10/2026    EKG 12 lead     Standing Status:   Future     Number of Occurrences:   1     Expected Date:   4/10/2025     Expiration Date:   6/9/2025     Reason for Exam?:   Dizziness        No results found for this visit on 04/10/25.    Return if symptoms worsen or fail to improve.    Electronically signed by MADALYN Owens CNP on 04/11/25 at 5:26 PM.                    
left-side extremities/right-side extremities/normal